# Patient Record
Sex: FEMALE | Race: BLACK OR AFRICAN AMERICAN | Employment: FULL TIME | URBAN - METROPOLITAN AREA
[De-identification: names, ages, dates, MRNs, and addresses within clinical notes are randomized per-mention and may not be internally consistent; named-entity substitution may affect disease eponyms.]

---

## 2017-01-04 ENCOUNTER — TELEPHONE (OUTPATIENT)
Dept: BARIATRICS/WEIGHT MGMT | Age: 57
End: 2017-01-04

## 2017-02-20 ENCOUNTER — OFFICE VISIT (OUTPATIENT)
Dept: INTERNAL MEDICINE CLINIC | Age: 57
End: 2017-02-20

## 2017-02-20 VITALS
SYSTOLIC BLOOD PRESSURE: 136 MMHG | TEMPERATURE: 97.6 F | WEIGHT: 209 LBS | DIASTOLIC BLOOD PRESSURE: 76 MMHG | RESPIRATION RATE: 12 BRPM | HEART RATE: 73 BPM | HEIGHT: 61 IN | BODY MASS INDEX: 39.46 KG/M2 | OXYGEN SATURATION: 98 %

## 2017-02-20 DIAGNOSIS — K64.9 HEMORRHOIDS, UNSPECIFIED HEMORRHOID TYPE: ICD-10-CM

## 2017-02-20 DIAGNOSIS — Z01.818 PREOPERATIVE EXAMINATION: Primary | ICD-10-CM

## 2017-02-20 DIAGNOSIS — G47.33 OBSTRUCTIVE SLEEP APNEA: ICD-10-CM

## 2017-02-20 DIAGNOSIS — J30.9 ALLERGIC RHINITIS, UNSPECIFIED ALLERGIC RHINITIS TRIGGER, UNSPECIFIED RHINITIS SEASONALITY: ICD-10-CM

## 2017-02-20 DIAGNOSIS — E78.5 HYPERLIPIDEMIA, UNSPECIFIED HYPERLIPIDEMIA TYPE: ICD-10-CM

## 2017-02-20 DIAGNOSIS — E55.9 VITAMIN D DEFICIENCY: ICD-10-CM

## 2017-02-20 PROCEDURE — 99243 OFF/OP CNSLTJ NEW/EST LOW 30: CPT | Performed by: INTERNAL MEDICINE

## 2017-02-20 ASSESSMENT — ENCOUNTER SYMPTOMS
DIARRHEA: 0
CHEST TIGHTNESS: 0
COUGH: 0
RHINORRHEA: 0
WHEEZING: 0
RECTAL PAIN: 1
TROUBLE SWALLOWING: 0
VOMITING: 0
EYE ITCHING: 1
SORE THROAT: 0
NAUSEA: 0
SINUS PRESSURE: 0
ANAL BLEEDING: 1
SHORTNESS OF BREATH: 0
ABDOMINAL PAIN: 0

## 2017-02-21 ENCOUNTER — TELEPHONE (OUTPATIENT)
Dept: INTERNAL MEDICINE CLINIC | Age: 57
End: 2017-02-21

## 2017-03-02 ENCOUNTER — HOSPITAL ENCOUNTER (OUTPATIENT)
Dept: OTHER | Age: 57
Discharge: HOME | End: 2017-03-02
Attending: INTERNAL MEDICINE | Admitting: INTERNAL MEDICINE

## 2017-03-02 DIAGNOSIS — E55.9 VITAMIN D DEFICIENCY: ICD-10-CM

## 2017-03-02 DIAGNOSIS — E78.5 HYPERLIPIDEMIA, UNSPECIFIED HYPERLIPIDEMIA TYPE: ICD-10-CM

## 2017-03-02 LAB
ALBUMIN SERPL-MCNC: 4 G/DL (ref 3.4–5)
ALP BLD-CCNC: 94 U/L (ref 40–129)
ALT SERPL-CCNC: 20 U/L (ref 10–40)
AST SERPL-CCNC: 18 U/L (ref 15–37)
BILIRUB SERPL-MCNC: <0.2 MG/DL (ref 0–1)
BILIRUBIN DIRECT: <0.2 MG/DL (ref 0–0.3)
BILIRUBIN, INDIRECT: NORMAL MG/DL (ref 0–1)
CHOLESTEROL, TOTAL: 187 MG/DL (ref 0–199)
HDLC SERPL-MCNC: 63 MG/DL (ref 40–60)
LDL CHOLESTEROL CALCULATED: 112 MG/DL
TOTAL PROTEIN: 7.1 G/DL (ref 6.4–8.2)
TRIGL SERPL-MCNC: 62 MG/DL (ref 0–150)
VITAMIN D 25-HYDROXY: 15.9 NG/ML
VLDLC SERPL CALC-MCNC: 12 MG/DL

## 2017-04-04 DIAGNOSIS — E55.9 VITAMIN D DEFICIENCY: Primary | ICD-10-CM

## 2017-04-04 DIAGNOSIS — E78.5 HYPERLIPIDEMIA, UNSPECIFIED HYPERLIPIDEMIA TYPE: ICD-10-CM

## 2017-04-04 RX ORDER — CHOLECALCIFEROL (VITAMIN D3) 50 MCG
2000 TABLET ORAL DAILY
Qty: 30 TABLET | Refills: 5 | Status: SHIPPED | OUTPATIENT
Start: 2017-04-04 | End: 2019-01-06 | Stop reason: DRUGHIGH

## 2017-04-04 RX ORDER — SIMVASTATIN 40 MG
40 TABLET ORAL NIGHTLY
Qty: 90 TABLET | Refills: 2 | Status: SHIPPED | OUTPATIENT
Start: 2017-04-04 | End: 2019-01-02 | Stop reason: SDUPTHER

## 2017-07-05 ENCOUNTER — TELEPHONE (OUTPATIENT)
Dept: INTERNAL MEDICINE CLINIC | Age: 57
End: 2017-07-05

## 2017-07-05 DIAGNOSIS — M79.673 PAIN OF FOOT, UNSPECIFIED LATERALITY: Primary | ICD-10-CM

## 2017-07-20 ENCOUNTER — OFFICE VISIT (OUTPATIENT)
Dept: ORTHOPEDIC SURGERY | Age: 57
End: 2017-07-20

## 2017-07-20 VITALS
SYSTOLIC BLOOD PRESSURE: 134 MMHG | BODY MASS INDEX: 39.46 KG/M2 | DIASTOLIC BLOOD PRESSURE: 88 MMHG | WEIGHT: 209 LBS | HEART RATE: 77 BPM | HEIGHT: 61 IN

## 2017-07-20 DIAGNOSIS — M79.671 PAIN OF RIGHT HEEL: Primary | ICD-10-CM

## 2017-07-20 DIAGNOSIS — M77.51 RETROCALCANEAL BURSITIS (BACK OF HEEL), RIGHT: ICD-10-CM

## 2017-07-20 DIAGNOSIS — M76.61 RIGHT ACHILLES TENDINITIS: ICD-10-CM

## 2017-07-20 PROBLEM — M77.50 RETROCALCANEAL BURSITIS (BACK OF HEEL): Status: ACTIVE | Noted: 2017-07-20

## 2017-07-20 PROCEDURE — L4361 PNEUMA/VAC WALK BOOT PRE OTS: HCPCS | Performed by: PODIATRIST

## 2017-07-20 PROCEDURE — 99203 OFFICE O/P NEW LOW 30 MIN: CPT | Performed by: PODIATRIST

## 2017-07-20 PROCEDURE — 73650 X-RAY EXAM OF HEEL: CPT | Performed by: PODIATRIST

## 2017-07-20 RX ORDER — SIMVASTATIN 40 MG
40 TABLET ORAL
COMMUNITY
End: 2017-10-23 | Stop reason: SDUPTHER

## 2017-07-20 RX ORDER — PREDNISONE 10 MG/1
TABLET ORAL
Qty: 26 TABLET | Refills: 0 | Status: SHIPPED | OUTPATIENT
Start: 2017-07-20 | End: 2017-11-28 | Stop reason: ALTCHOICE

## 2017-08-17 ENCOUNTER — TELEPHONE (OUTPATIENT)
Dept: ORTHOPEDIC SURGERY | Age: 57
End: 2017-08-17

## 2017-08-17 ENCOUNTER — OFFICE VISIT (OUTPATIENT)
Dept: ORTHOPEDIC SURGERY | Age: 57
End: 2017-08-17

## 2017-08-17 VITALS
HEIGHT: 61 IN | HEART RATE: 74 BPM | BODY MASS INDEX: 39.46 KG/M2 | SYSTOLIC BLOOD PRESSURE: 122 MMHG | WEIGHT: 209 LBS | DIASTOLIC BLOOD PRESSURE: 74 MMHG

## 2017-08-17 DIAGNOSIS — M76.61 ACHILLES TENDINITIS, RIGHT LEG: ICD-10-CM

## 2017-08-17 DIAGNOSIS — M77.51 RETROCALCANEAL BURSITIS (BACK OF HEEL), RIGHT: Primary | ICD-10-CM

## 2017-08-17 PROCEDURE — 99213 OFFICE O/P EST LOW 20 MIN: CPT | Performed by: PODIATRIST

## 2017-09-07 ENCOUNTER — OFFICE VISIT (OUTPATIENT)
Dept: ORTHOPEDIC SURGERY | Age: 57
End: 2017-09-07

## 2017-09-07 VITALS
SYSTOLIC BLOOD PRESSURE: 117 MMHG | BODY MASS INDEX: 39.46 KG/M2 | DIASTOLIC BLOOD PRESSURE: 72 MMHG | HEIGHT: 61 IN | WEIGHT: 209 LBS | HEART RATE: 64 BPM

## 2017-09-07 DIAGNOSIS — M76.61 ACHILLES TENDINITIS, RIGHT LEG: Primary | ICD-10-CM

## 2017-09-07 PROCEDURE — 99213 OFFICE O/P EST LOW 20 MIN: CPT | Performed by: PODIATRIST

## 2017-09-07 RX ORDER — DEXAMETHASONE SODIUM PHOSPHATE 4 MG/ML
INJECTION, SOLUTION INTRA-ARTICULAR; INTRALESIONAL; INTRAMUSCULAR; INTRAVENOUS; SOFT TISSUE
Qty: 30 ML | Refills: 0 | Status: SHIPPED | OUTPATIENT
Start: 2017-09-07 | End: 2017-11-28 | Stop reason: ALTCHOICE

## 2017-10-04 ENCOUNTER — HOSPITAL ENCOUNTER (OUTPATIENT)
Dept: PHYSICAL THERAPY | Age: 57
Discharge: HOME | End: 2017-10-31
Attending: PODIATRIST | Admitting: PODIATRIST

## 2017-10-04 DIAGNOSIS — E78.5 OTHER AND UNSPECIFIED HYPERLIPIDEMIA: ICD-10-CM

## 2017-10-04 ASSESSMENT — PAIN DESCRIPTION - ORIENTATION: ORIENTATION: RIGHT

## 2017-10-04 ASSESSMENT — PAIN DESCRIPTION - PROGRESSION: CLINICAL_PROGRESSION: GRADUALLY WORSENING

## 2017-10-04 ASSESSMENT — PAIN SCALES - GENERAL: PAINLEVEL_OUTOF10: 9

## 2017-10-04 ASSESSMENT — PAIN DESCRIPTION - FREQUENCY: FREQUENCY: CONTINUOUS

## 2017-10-04 ASSESSMENT — PAIN DESCRIPTION - ONSET: ONSET: GRADUAL

## 2017-10-04 NOTE — PLAN OF CARE
Outpatient Physical Therapy     Phone: [phone] Fax: [phone]     To: Referring Practitioner: Dr. Gayle Delcid       Patient: Samara Briggs   : 1960   MRN: [id number]  Evaluation Date: 10/4/2017      Diagnosis Information:  · Diagnosis: Achilles tendonitis M76.61    · Treatment Diagnosis: Decreased R ankle ROM, decreased gastroc/soleus flexibility, impaired balance,impaired gait, myofascial restrictions gastroc/soleus complexity      Physical Therapy Certification/Re-Certification Form  Dear Dr. Sherley Meléndez   The following patient has been evaluated for physical therapy services. Please review the attached evaluation and/or summary of the patient's plan of care, and verify that you agree therapy should continue by signing the attached document and sending it back to our office. Plan of Care/Treatment to date:  [x] Therapeutic Exercise      [x] Modalities:  [x] Therapeutic Activity        [] Ultrasound    [x] Gait Training        [] Cervical Traction   [x] Neuromuscular Re-education      [] Cold/hotpack    [x] Instruction in HEP        [] Lumbar Traction  [x] Manual Therapy        [] Electrical Stimulation            [x] Aquatic Therapy        [] Iontophoresis        ? [] Lymphedema management  [] Women's Health     Other:  [] Vestibular Rehab        []    []  Needed     Frequency/Duration:  # Days per week: [x] 1 day # Weeks: [] 1 week [] 5 weeks     [] 2 days? [] 2 weeks [] 6 weeks     [] 3 days   [] 3 weeks [] 7 weeks     [] 4 days   [x] 4 weeks [] 8 weeks    Rehab Potential: [] Excellent [x] Good [] Fair  [] Poor     Electronically signed by:  Sandi Grigsby, PT,DPT     If you have any questions or concerns, please don't hesitate to call.   Thank you for your referral.      Physician Signature:________________________________Date:__________________  By signing above, therapists plan is approved by physician

## 2017-10-04 NOTE — FLOWSHEET NOTE
Physical Therapy Daily Treatment Note  Date:  10/4/2017    Patient Name:  Duarte Mc    :  1960  MRN: [id number]  Restrictions/Precautions:    Medical/Treatment Diagnosis Information:   · Diagnosis: Achilles tendonitis M76.61   · Treatment Diagnosis: Decreased R ankle ROM, decreased gastroc/soleus flexibility, impaired balance,impaired gait, myofascial restrictions gastroc/soleus complexity     Tracking Information:  Physician Information Referring Practitioner: Dr. Tang Espinosa of Care Sent Date: 10/4 Signed Received:    Visit Count / Total Visits      Insurance Approved Visits    Approved Dates:     Insurance Information PT Insurance Information: [employer] ($30 copay) (50 visits per year PT/OT, ST combined, 0 used so far)     Progress Note/G-codes   [x]  Yes  []  No Next Due:      Pain level: 9/10     Subjective:  See eval     Objective:   Observation: see eval   Test measurements:      Exercises:  Exercise/Equipment Resistance/Repetitions Other comments   Bike  IB/HR      Stretching and STM/DTM gastroc/soleus     Balance as tolerated      Intrinsic foot strengthening      Ankle strengthening      Mat table  Gastroc/soleus stretch 2X30\" R  Self MFR R gastroc/soleus complex X 2'  Self MFR plantarfascia with tennis ball  Towel scrunches X 10  Great toe stretch 2X30\" R    HEP                                               Other Therapeutic Activities:  10/4/17 Pt was educated on PT POC, Diagnosis, Prognosis, pathomechanics as well as frequency and duration of scheduling future physical therapy appointments. Time was also taken on this day to answer all patient questions and participation in PT. Home Exercise Program:  10/4/17 Patient was educated on home exercise program including distrubution of handout describing exercises, sets, repetitions, frequency and intensity. Exercises/activities include: as listed above.        Manual Treatments: 10/4: none, consider hawk  gastroc/soleus complex and plantarfascia     Modalities:  10/4: CP R ankle X 10' pt in long sitting; consider US       Timed Code Treatment Minutes: 40     Total Treatment Minutes:  65    Treatment/Activity Tolerance:  [x] Patient tolerated treatment well [] Patient limited by fatigue  [] Patient limited by pain  [] Patient limited by other medical complications  [] Other:     Prognosis: [x] Good [] Fair  [] Poor    Patient Requires Follow-up: [x] Yes  [] No    Plan:   [] Continue per plan of care [] Alter current plan (see comments)  [x] Plan of care initiated [] Hold pending MD visit [] Discharge  Plan for Next Session: see above      Electronically signed by:  Ayaka Malin, PT,DPT

## 2017-10-04 NOTE — PROGRESS NOTES
Currently in Pain: Yes  Pain Assessment  Pain Assessment: 0-10  Pain Level: 9  Pain Location:  (R heel)  Pain Orientation: Right  Pain Descriptors: Burning; Callie Harris; Shooting;Pins and needles; Constant  Pain Frequency: Continuous  Pain Onset: Gradual  Clinical Progression: Gradually worsening  Effect of Pain on Daily Activities: ind with all ADLs, just has pain when standing and walking   Patient's Stated Pain Goal: No pain  Pain Intervention(s): Rest (alleve)  Vital Signs  Patient Currently in Pain: Yes    Vision/Hearing  Vision  Vision: Within Functional Limits  Hearing  Hearing: Within functional limits      Social/Functional History  Social/Functional History  Lives With: Spouse  Type of Home: House  Home Layout: Two level; Laundry in basement (1 HR on R)  Home Access: Stairs to enter without rails  Entrance Stairs - Number of Steps: 2 ARCHANA  Bathroom Shower/Tub: Tub/Shower unit  Bathroom Toilet: Standard  Home Equipment:  (knee scooter)  Receives Help From: Family  ADL Assistance: Independent  Homemaking Assistance: Independent  Homemaking Responsibilities: Yes  Ambulation Assistance: Independent  Transfer Assistance: Independent  Active : Yes  Mode of Transportation: Car  Occupation: Full time employment  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   Leisure & Hobbies: line dancing, exercise   Objective     Observation/Palpation  Posture: Fair  Observation: flat feet, pronation with gait (R>L), increased toe out on L     AROM RLE (degrees)  R Ankle Dorsiflexion 0-20: lacking 10 deg, lacking 5 with OP   R Ankle Plantar Flexion 0-45: WNL some pain   R Ankle Forefoot Inversion 0-40: 50 deg painful  R Ankle Forefoot Eversion 0-20: WNL no pain   AROM LLE (degrees)  L Ankle Dorsiflexion 0-20: lacking 10 deg, lacking 5 with OP   L Ankle Plantar Flexion 0-45: WNL  L Ankle Forefoot Inversion 0-40: 30 deg  L Ankle Forefoot Eversion 0-20: 15 deg    Strength RLE  Comment: great toe flexion 4 - 4+/5, great toe gastroc/soleus flexibility, impaired gait, impaired balance, and decreased LE strength resulting in difficulty with ambulation, difficulty amb up/down stairs, and difficulty sleeping. pt is in generally good health and is of low complexity. pt would benfit from skilled PT services to return to PLOF. Treatment Diagnosis: Decreased R ankle ROM, decreased gastroc/soleus flexibility, impaired balance,impaired gait, myofascial restrictions gastroc/soleus complexity   Prognosis: Good  Decision Making: Low Complexity  Patient Education: pt educated on PT diagnosis, prognosis, and POC. Barriers to Learning: none   REQUIRES PT FOLLOW UP: Yes  Activity Tolerance  Activity Tolerance: Patient Tolerated treatment well         Plan   Plan  Times per week: 1  Plan weeks: 4  Current Treatment Recommendations: Strengthening, ROM, Balance Training, Gait Training, Stair training, Neuromuscular Re-education, Manual Therapy - Soft Tissue Mobilization, Manual Therapy - Joint Manipulation, Pain Management, Home Exercise Program, Positioning, Modalities    G-Code  PT G-Codes  Functional Assessment Tool Used: LEFS   Score: 28.75 impaired   Functional Limitation: Mobility: Walking and moving around  Mobility: Walking and Moving Around Current Status (): At least 20 percent but less than 40 percent impaired, limited or restricted  Mobility: Walking and Moving Around Goal Status (): 0 percent impaired, limited or restricted    OutComes Score     LEFS Score: 71.25                  Goals  Long term goals  Time Frame for Long term goals : 4 weeks   Long term goal 1: pt will demonstrate improved R ankle DF AROM to at least 5 deg to demonstrate improved gastroc/soleus flexibility and decrease stress on Jevon's tendon. Long term goal 2: pt will report decreased pain from 0-2/10 on average to return to PLOF.    Long term goal 3: pt will be able to perform single leg stance for at least 10 seconds B to demonstrate improved balance and

## 2017-10-21 DIAGNOSIS — E55.9 VITAMIN D DEFICIENCY: ICD-10-CM

## 2017-10-23 RX ORDER — ERGOCALCIFEROL 1.25 MG/1
CAPSULE ORAL
Qty: 12 CAPSULE | Refills: 0 | Status: SHIPPED | OUTPATIENT
Start: 2017-10-23 | End: 2018-12-18 | Stop reason: SDDI

## 2017-11-01 ENCOUNTER — HOSPITAL ENCOUNTER (OUTPATIENT)
Dept: OTHER | Age: 57
Discharge: HOME | End: 2017-11-30
Attending: PODIATRIST | Admitting: PODIATRIST

## 2017-11-28 ENCOUNTER — OFFICE VISIT (OUTPATIENT)
Dept: INTERNAL MEDICINE CLINIC | Age: 57
End: 2017-11-28

## 2017-11-28 ENCOUNTER — HOSPITAL ENCOUNTER (OUTPATIENT)
Dept: MAMMOGRAPHY | Age: 57
Discharge: HOME | End: 2017-11-28
Attending: INTERNAL MEDICINE | Admitting: INTERNAL MEDICINE

## 2017-11-28 VITALS
HEART RATE: 79 BPM | DIASTOLIC BLOOD PRESSURE: 78 MMHG | WEIGHT: 205.8 LBS | OXYGEN SATURATION: 98 % | SYSTOLIC BLOOD PRESSURE: 124 MMHG | HEIGHT: 61 IN | TEMPERATURE: 97.7 F | RESPIRATION RATE: 12 BRPM | BODY MASS INDEX: 38.86 KG/M2

## 2017-11-28 DIAGNOSIS — Z00.00 ANNUAL PHYSICAL EXAM: ICD-10-CM

## 2017-11-28 DIAGNOSIS — E55.9 VITAMIN D DEFICIENCY: ICD-10-CM

## 2017-11-28 DIAGNOSIS — J40 BRONCHITIS: ICD-10-CM

## 2017-11-28 DIAGNOSIS — Z12.39 BREAST CANCER SCREENING: ICD-10-CM

## 2017-11-28 DIAGNOSIS — Z00.00 ANNUAL PHYSICAL EXAM: Primary | ICD-10-CM

## 2017-11-28 DIAGNOSIS — E78.2 MIXED HYPERLIPIDEMIA: ICD-10-CM

## 2017-11-28 DIAGNOSIS — Z13.6 SCREENING FOR ISCHEMIC HEART DISEASE: ICD-10-CM

## 2017-11-28 DIAGNOSIS — N95.9 MENOPAUSAL DISORDER: ICD-10-CM

## 2017-11-28 DIAGNOSIS — M25.512 LEFT SHOULDER PAIN, UNSPECIFIED CHRONICITY: ICD-10-CM

## 2017-11-28 DIAGNOSIS — J06.9 UPPER RESPIRATORY TRACT INFECTION, UNSPECIFIED TYPE: ICD-10-CM

## 2017-11-28 LAB
A/G RATIO: 1.5 (ref 1.1–2.2)
ALBUMIN SERPL-MCNC: 4.2 G/DL (ref 3.4–5)
ALP BLD-CCNC: 100 U/L (ref 40–129)
ALT SERPL-CCNC: 20 U/L (ref 10–40)
ANION GAP SERPL CALCULATED.3IONS-SCNC: 12 MMOL/L (ref 3–16)
AST SERPL-CCNC: 22 U/L (ref 15–37)
BASOPHILS ABSOLUTE: 0 K/UL (ref 0–0.2)
BASOPHILS RELATIVE PERCENT: 0.6 %
BILIRUB SERPL-MCNC: 0.3 MG/DL (ref 0–1)
BILIRUBIN, POC: NORMAL
BLOOD URINE, POC: NORMAL
BUN BLDV-MCNC: 15 MG/DL (ref 7–20)
CALCIUM SERPL-MCNC: 9.5 MG/DL (ref 8.3–10.6)
CHLORIDE BLD-SCNC: 102 MMOL/L (ref 99–110)
CHOLESTEROL, TOTAL: 219 MG/DL (ref 0–199)
CLARITY, POC: NORMAL
CO2: 27 MMOL/L (ref 21–32)
COLOR, POC: NORMAL
CREAT SERPL-MCNC: 0.7 MG/DL (ref 0.6–1.1)
EOSINOPHILS ABSOLUTE: 0.1 K/UL (ref 0–0.6)
EOSINOPHILS RELATIVE PERCENT: 2.7 %
GFR AFRICAN AMERICAN: >60
GFR NON-AFRICAN AMERICAN: >60
GLOBULIN: 2.8 G/DL
GLUCOSE BLD-MCNC: 72 MG/DL (ref 70–99)
GLUCOSE URINE, POC: NORMAL
HCT VFR BLD CALC: 37.6 % (ref 36–48)
HDLC SERPL-MCNC: 81 MG/DL (ref 40–60)
HEMOGLOBIN: 12 G/DL (ref 12–16)
KETONES, POC: NORMAL
LDL CHOLESTEROL CALCULATED: 125 MG/DL
LEUKOCYTE EST, POC: NORMAL
LYMPHOCYTES ABSOLUTE: 1.9 K/UL (ref 1–5.1)
LYMPHOCYTES RELATIVE PERCENT: 36.4 %
MCH RBC QN AUTO: 26.4 PG (ref 26–34)
MCHC RBC AUTO-ENTMCNC: 32 G/DL (ref 31–36)
MCV RBC AUTO: 82.6 FL (ref 80–100)
MONOCYTES ABSOLUTE: 0.6 K/UL (ref 0–1.3)
MONOCYTES RELATIVE PERCENT: 11.1 %
NEUTROPHILS ABSOLUTE: 2.5 K/UL (ref 1.7–7.7)
NEUTROPHILS RELATIVE PERCENT: 49.2 %
NITRITE, POC: NORMAL
PDW BLD-RTO: 14.3 % (ref 12.4–15.4)
PH, POC: 6
PLATELET # BLD: 335 K/UL (ref 135–450)
PMV BLD AUTO: 8.3 FL (ref 5–10.5)
POTASSIUM SERPL-SCNC: 4 MMOL/L (ref 3.5–5.1)
PROTEIN, POC: NORMAL
RBC # BLD: 4.55 M/UL (ref 4–5.2)
SODIUM BLD-SCNC: 141 MMOL/L (ref 136–145)
SPECIFIC GRAVITY, POC: >=1.03
TOTAL PROTEIN: 7 G/DL (ref 6.4–8.2)
TRIGL SERPL-MCNC: 64 MG/DL (ref 0–150)
TSH SERPL DL<=0.05 MIU/L-ACNC: 0.86 UIU/ML (ref 0.27–4.2)
UROBILINOGEN, POC: 0.2
VITAMIN D 25-HYDROXY: 25.1 NG/ML
VLDLC SERPL CALC-MCNC: 13 MG/DL
WBC # BLD: 5.1 K/UL (ref 4–11)

## 2017-11-28 PROCEDURE — 93000 ELECTROCARDIOGRAM COMPLETE: CPT | Performed by: INTERNAL MEDICINE

## 2017-11-28 PROCEDURE — 81002 URINALYSIS NONAUTO W/O SCOPE: CPT | Performed by: INTERNAL MEDICINE

## 2017-11-28 PROCEDURE — 99396 PREV VISIT EST AGE 40-64: CPT | Performed by: INTERNAL MEDICINE

## 2017-11-28 RX ORDER — AZITHROMYCIN 250 MG/1
TABLET, FILM COATED ORAL
Qty: 1 PACKET | Refills: 0 | Status: SHIPPED | OUTPATIENT
Start: 2017-11-28 | End: 2017-12-08

## 2017-11-28 RX ORDER — ALBUTEROL SULFATE 90 UG/1
2 AEROSOL, METERED RESPIRATORY (INHALATION) EVERY 6 HOURS PRN
Qty: 1 INHALER | Refills: 0 | Status: SHIPPED | OUTPATIENT
Start: 2017-11-28 | End: 2021-07-01

## 2017-11-28 RX ORDER — BROMPHENIRAMINE MALEATE, PSEUDOEPHEDRINE HYDROCHLORIDE, AND DEXTROMETHORPHAN HYDROBROMIDE 2; 30; 10 MG/5ML; MG/5ML; MG/5ML
5 SYRUP ORAL 4 TIMES DAILY PRN
Qty: 120 ML | Refills: 0 | Status: SHIPPED | OUTPATIENT
Start: 2017-11-28 | End: 2018-12-18

## 2017-11-28 ASSESSMENT — ENCOUNTER SYMPTOMS
CONSTIPATION: 0
VOMITING: 0
APNEA: 1
EYE ITCHING: 0
SINUS PAIN: 0
PHOTOPHOBIA: 0
ANAL BLEEDING: 0
RHINORRHEA: 0
ABDOMINAL PAIN: 0
FACIAL SWELLING: 0
DIARRHEA: 0
CHOKING: 0
SINUS PRESSURE: 0
ABDOMINAL DISTENTION: 0
RECTAL PAIN: 0
EYE REDNESS: 0
COUGH: 1
TROUBLE SWALLOWING: 0
BACK PAIN: 1
EYE PAIN: 0
EYE DISCHARGE: 0
BLOOD IN STOOL: 0
WHEEZING: 1
NAUSEA: 0
SORE THROAT: 0
VOICE CHANGE: 1
CHEST TIGHTNESS: 0
SHORTNESS OF BREATH: 0

## 2017-11-28 ASSESSMENT — PATIENT HEALTH QUESTIONNAIRE - PHQ9
1. LITTLE INTEREST OR PLEASURE IN DOING THINGS: 1
SUM OF ALL RESPONSES TO PHQ9 QUESTIONS 1 & 2: 2
SUM OF ALL RESPONSES TO PHQ QUESTIONS 1-9: 2
2. FEELING DOWN, DEPRESSED OR HOPELESS: 1

## 2017-11-28 NOTE — PROGRESS NOTES
Santhosh Lucero   YOB: 1960    Date of Visit:  2017    Chief Complaint   Patient presents with    Annual Exam     fasting        HPI  Pt presents for annual physical exam.    Pt has hyperlipidemia. Pt tolerates Simvastatin . Pt hasn't been dec fat and exercising. Pt is not exercising due to achilles tendonitis. Pt has vitamin D deficiency. Pt is taking vitamin D 50k out of daily for 30 days    Pt is menopausal. Pt takes Premarin 0.3mg daily. Pt has taken hormones since . Pt c/o 1 month h/o cough with mucus. Yellow 2 weeks ago and now moving to chest. Pt c/o wheezing and tickle in her throat. Pt staes her inhaler  in 2017. Pt is taking Robitussin. Mammogram done today  Colonoscopy done 17  Left shoulder pain for a long time at joint. Pt states if she raises her arm at least 5 times in a row it aggravates it. Pt states it hurts if is it going to rain. Pt states it is a constant ache. Review of Systems   Constitutional: Positive for unexpected weight change (weight is up and down). Negative for activity change, appetite change, chills, diaphoresis, fatigue and fever. HENT: Positive for congestion, postnasal drip and voice change (last week). Negative for ear discharge, ear pain, facial swelling, hearing loss, mouth sores, nosebleeds, rhinorrhea, sinus pain, sinus pressure, sneezing, sore throat, tinnitus and trouble swallowing. Eyes: Negative for photophobia, pain, discharge, redness, itching and visual disturbance. Respiratory: Positive for apnea (has sleep apnea doesn't use CPAP), cough and wheezing. Negative for choking, chest tightness and shortness of breath. Cardiovascular: Negative for chest pain, palpitations and leg swelling. Gastrointestinal: Negative for abdominal distention, abdominal pain, anal bleeding, blood in stool, constipation, diarrhea, nausea, rectal pain and vomiting.    Endocrine: Negative for cold intolerance and heat 12 capsule 0    Cholecalciferol (VITAMIN D) 2000 UNITS TABS tablet Take 1 tablet by mouth daily 30 tablet 5    simvastatin (ZOCOR) 40 MG tablet Take 1 tablet by mouth nightly 90 tablet 2    estrogens, conjugated, (PREMARIN) 0.3 MG tablet TAKE 1 TABLET BY MOUTH DAILY 30 tablet 5    loratadine (CLARITIN) 10 MG tablet Take 1 tablet by mouth daily. 30 tablet 3         Allergies   Allergen Reactions    Bee Venom Swelling    Fish-Derived Products Swelling    Other Swelling     TREE NUTS    Shellfish Allergy Swelling    Tylenol [Acetaminophen]      Tylenol 3 makes pt itch       Social History   Substance Use Topics    Smoking status: Never Smoker    Smokeless tobacco: Never Used    Alcohol use No       Family History   Problem Relation Age of Onset    Cancer Mother 64      lung cancer    Asthma Grandchild     Diabetes Neg Hx     Heart Disease Neg Hx     Hypertension Neg Hx        Immunization History   Administered Date(s) Administered    Tdap (Boostrix, Adacel) 2016       Vitals:    17 1107   BP: 124/78   Site: Left Arm   Position: Sitting   Cuff Size: Large Adult   Pulse: 79   Resp: 12   Temp: 97.7 °F (36.5 °C)   TempSrc: Oral   SpO2: 98%   Weight: 205 lb 12.8 oz (93.4 kg)   Height: 5' 1\" (1.549 m)     Body mass index is 38.89 kg/m². Physical Exam   Constitutional: She is oriented to person, place, and time. She appears well-developed and well-nourished. No distress. HENT:   Head: Normocephalic and atraumatic. Right Ear: Hearing, tympanic membrane and ear canal normal.   Left Ear: Hearing, tympanic membrane and ear canal normal.   Nose: Nose normal.   Mouth/Throat: Uvula is midline, oropharynx is clear and moist and mucous membranes are normal.   Eyes: Conjunctivae, EOM and lids are normal. Pupils are equal, round, and reactive to light. Neck: Neck supple. Carotid bruit is not present. No thyromegaly present.    Cardiovascular: Normal rate, regular rhythm, S1 normal, S2 normal, normal heart sounds and intact distal pulses. Exam reveals no gallop and no friction rub. No murmur heard. Pulmonary/Chest: Effort normal and breath sounds normal. No respiratory distress. She has no wheezes. She has no rhonchi. She has no rales. Right breast exhibits no inverted nipple, no mass, no nipple discharge, no skin change and no tenderness. Left breast exhibits no inverted nipple, no mass, no nipple discharge, no skin change and no tenderness. Breasts are symmetrical.   Abdominal: Soft. Bowel sounds are normal. She exhibits no distension and no mass. There is no hepatosplenomegaly. There is no tenderness. There is no rebound. Musculoskeletal: Normal range of motion. She exhibits no edema. Right shoulder: She exhibits normal range of motion and no tenderness. Left shoulder: She exhibits tenderness. She exhibits normal range of motion. Right elbow: She exhibits no swelling. No tenderness found. Left elbow: She exhibits no swelling. No tenderness found. Right wrist: She exhibits no tenderness and no swelling. Left wrist: She exhibits no tenderness and no swelling. Right hip: She exhibits no tenderness. Left hip: She exhibits no tenderness. Right knee: She exhibits no swelling. No tenderness found. Left knee: She exhibits no swelling. No tenderness found. Right ankle: She exhibits no swelling. No tenderness. Left ankle: She exhibits no swelling. No tenderness. Cervical back: She exhibits normal range of motion, no tenderness and no spasm. Thoracic back: She exhibits no tenderness and no spasm. Lumbar back: She exhibits normal range of motion, no tenderness and no spasm. Right upper arm: She exhibits no tenderness. Left upper arm: She exhibits no tenderness. Right hand: She exhibits no tenderness and no swelling. Left hand: She exhibits no tenderness and no swelling. Right upper leg: She exhibits no tenderness. Left upper leg: She exhibits no tenderness. Right lower leg: She exhibits no tenderness. Left lower leg: She exhibits no tenderness. Right foot: There is no tenderness and no swelling. Left foot: There is no tenderness and no swelling. Lymphadenopathy:        Head (right side): No submandibular adenopathy present. Head (left side): No submandibular adenopathy present. She has no cervical adenopathy. She has no axillary adenopathy. Neurological: She is alert and oriented to person, place, and time. She has normal strength and normal reflexes. No cranial nerve deficit. Gait normal.   Skin: Skin is warm, dry and intact. No bruising, no lesion and no rash noted. No erythema. Psychiatric: She has a normal mood and affect. Her speech is normal.           Results for POC orders placed in visit on 11/28/17   POCT Urinalysis no Micro   Result Value Ref Range    Color, UA neg     Clarity, UA neg     Glucose, UA POC neg     Bilirubin, UA neg     Ketones, UA neg     Spec Grav, UA >=1.030     Blood, UA POC neg     pH, UA 6.0     Protein, UA POC neg     Urobilinogen, UA 0.2     Leukocytes, UA neg     Nitrite, UA neg        Assessment/Plan     1. Annual physical exam    - POCT Urinalysis no Micro  - Comprehensive Metabolic Panel; Future  - CBC Auto Differential; Future  - Lipid Panel; Future  - TSH without Reflex; Future    2. Mixed hyperlipidemia    - Lipid Panel; Future    3. Vitamin D deficiency    - Vitamin D 25 Hydroxy; Future    4. Menopausal disorder      5. Screening for ischemic heart disease  - EKG 12 Lead    6. Upper respiratory tract infection, unspecified type    - azithromycin (ZITHROMAX) 250 MG tablet; Take 2 tabs (500 mg) on Day 1, and take 1 tab (250 mg) on days 2 through 5. Dispense: 1 packet; Refill: 0  - brompheniramine-pseudoephedrine-DM (BROMFED DM) 30-2-10 MG/5ML syrup;  Take 5 mLs by mouth 4 times daily as needed for Congestion or Cough  Dispense: 120 mL; Refill: 0    7. Bronchitis    - azithromycin (ZITHROMAX) 250 MG tablet; Take 2 tabs (500 mg) on Day 1, and take 1 tab (250 mg) on days 2 through 5. Dispense: 1 packet; Refill: 0  - albuterol sulfate HFA (PROAIR HFA) 108 (90 Base) MCG/ACT inhaler; Inhale 2 puffs into the lungs every 6 hours as needed for Wheezing  Dispense: 1 Inhaler; Refill: 0  - brompheniramine-pseudoephedrine-DM (BROMFED DM) 30-2-10 MG/5ML syrup; Take 5 mLs by mouth 4 times daily as needed for Congestion or Cough  Dispense: 120 mL; Refill: 0  - XR CHEST STANDARD (2 VW); Future    8. Left shoulder pain, unspecified chronicity    - Sabas Heath MD (Sports)      Discussed medications with patient, who voiced understanding of their use and indications. All questions answered. Return in about 6 months (around 5/28/2018) for hyperlipidemia and vitamin D deficiency.

## 2017-11-28 NOTE — PATIENT INSTRUCTIONS
alcohol can cause health problems. Follow your doctor's advice about when to have certain tests. These tests can spot problems early. · Cholesterol. Your doctor will tell you how often to have this done based on your age, family history, or other things that can increase your risk for heart attack and stroke. · Blood pressure. Have your blood pressure checked during a routine doctor visit. Your doctor will tell you how often to check your blood pressure based on your age, your blood pressure results, and other factors. · Mammogram. Ask your doctor how often you should have a mammogram, which is an X-ray of your breasts. A mammogram can spot breast cancer before it can be felt and when it is easiest to treat. · Pap test and pelvic exam. Ask your doctor how often you should have a Pap test. You may not need to have a Pap test as often as you used to. · Vision. Have your eyes checked every year or two or as often as your doctor suggests. Some experts recommend that you have yearly exams for glaucoma and other age-related eye problems starting at age 48. · Hearing. Tell your doctor if you notice any change in your hearing. You can have tests to find out how well you hear. · Diabetes. Ask your doctor whether you should have tests for diabetes. · Colon cancer. You should begin tests for colon cancer at age 48. You may have one of several tests. Your doctor will tell you how often to have tests based on your age and risk. Risks include whether you already had a precancerous polyp removed from your colon or whether your parents, sisters and brothers, or children have had colon cancer. · Thyroid disease. Talk to your doctor about whether to have your thyroid checked as part of a regular physical exam. Women have an increased chance of a thyroid problem. · Osteoporosis. You should begin tests for bone density at age 72.  If you are younger than 72, ask your doctor whether you have factors that may increase your risk for this disease. You may want to have this test before age 72. · Heart attack and stroke risk. At least every 4 to 6 years, you should have your risk for heart attack and stroke assessed. Your doctor uses factors such as your age, blood pressure, cholesterol, and whether you smoke or have diabetes to show what your risk for a heart attack or stroke is over the next 10 years. When should you call for help? Watch closely for changes in your health, and be sure to contact your doctor if you have any problems or symptoms that concern you. Where can you learn more? Go to [email or web address] org and sign in to your [employer] account. Enter T335 in the [facility] box to learn more about \"Well Visit, Women 50 to 72: Care Instructions. \"     If you do not have an account, please click on the \"Sign Up Now\" link. Current as of: July 19, 2016  Content Version: 11.3  © 0627-4920 [employer]. Care instructions adapted under license by [facility] ([facility]). If you have questions about a medical condition or this instruction, always ask your healthcare professional. [place] [address] any warranty or liability for your use of this information.

## 2017-12-01 ENCOUNTER — OFFICE VISIT (OUTPATIENT)
Dept: ORTHOPEDIC SURGERY | Age: 57
End: 2017-12-01

## 2017-12-01 VITALS — BODY MASS INDEX: 38.71 KG/M2 | HEIGHT: 61 IN | WEIGHT: 205 LBS

## 2017-12-01 DIAGNOSIS — M19.019 AC JOINT ARTHROPATHY: ICD-10-CM

## 2017-12-01 DIAGNOSIS — M25.512 LEFT SHOULDER PAIN, UNSPECIFIED CHRONICITY: Primary | ICD-10-CM

## 2017-12-01 PROCEDURE — 73030 X-RAY EXAM OF SHOULDER: CPT | Performed by: ORTHOPAEDIC SURGERY

## 2017-12-01 PROCEDURE — 99243 OFF/OP CNSLTJ NEW/EST LOW 30: CPT | Performed by: ORTHOPAEDIC SURGERY

## 2018-09-26 PROBLEM — Z01.818 PREOPERATIVE EXAMINATION: Status: RESOLVED | Noted: 2017-02-20 | Resolved: 2018-09-26

## 2018-12-13 DIAGNOSIS — E78.5 HYPERLIPIDEMIA, UNSPECIFIED HYPERLIPIDEMIA TYPE: ICD-10-CM

## 2018-12-13 RX ORDER — SIMVASTATIN 40 MG
TABLET ORAL
Qty: 90 TABLET | Refills: 0 | OUTPATIENT
Start: 2018-12-13

## 2018-12-18 ENCOUNTER — OFFICE VISIT (OUTPATIENT)
Dept: INTERNAL MEDICINE CLINIC | Age: 58
End: 2018-12-18
Payer: COMMERCIAL

## 2018-12-18 VITALS
DIASTOLIC BLOOD PRESSURE: 80 MMHG | BODY MASS INDEX: 41.88 KG/M2 | HEART RATE: 86 BPM | OXYGEN SATURATION: 96 % | SYSTOLIC BLOOD PRESSURE: 116 MMHG | WEIGHT: 221.8 LBS | HEIGHT: 61 IN

## 2018-12-18 DIAGNOSIS — R63.5 WEIGHT GAIN: ICD-10-CM

## 2018-12-18 DIAGNOSIS — E55.9 VITAMIN D DEFICIENCY: ICD-10-CM

## 2018-12-18 DIAGNOSIS — E78.2 MIXED HYPERLIPIDEMIA: Primary | ICD-10-CM

## 2018-12-18 DIAGNOSIS — E66.01 MORBID OBESITY WITH BODY MASS INDEX (BMI) OF 40.0 TO 44.9 IN ADULT (HCC): ICD-10-CM

## 2018-12-18 PROCEDURE — 99214 OFFICE O/P EST MOD 30 MIN: CPT | Performed by: INTERNAL MEDICINE

## 2018-12-18 ASSESSMENT — PATIENT HEALTH QUESTIONNAIRE - PHQ9
2. FEELING DOWN, DEPRESSED OR HOPELESS: 0
1. LITTLE INTEREST OR PLEASURE IN DOING THINGS: 0
SUM OF ALL RESPONSES TO PHQ QUESTIONS 1-9: 0
SUM OF ALL RESPONSES TO PHQ QUESTIONS 1-9: 0
SUM OF ALL RESPONSES TO PHQ9 QUESTIONS 1 & 2: 0

## 2018-12-18 NOTE — PROGRESS NOTES
Negative for dysphoric mood and sleep disturbance. The patient is not nervous/anxious. Allergies   Allergen Reactions    Bee Venom Swelling    Fish-Derived Products Swelling    Other Swelling     TREE NUTS    Shellfish Allergy Swelling    Tylenol [Acetaminophen]      Tylenol 3 makes pt itch     Outpatient Prescriptions Marked as Taking for the 12/18/18 encounter (Office Visit) with Mario Palafox MD   Medication Sig Dispense Refill    psyllium (METAMUCIL) 0.52 g capsule Take 1.04 g by mouth daily      albuterol sulfate HFA (PROAIR HFA) 108 (90 Base) MCG/ACT inhaler Inhale 2 puffs into the lungs every 6 hours as needed for Wheezing 1 Inhaler 0    Cholecalciferol (VITAMIN D) 2000 UNITS TABS tablet Take 1 tablet by mouth daily 30 tablet 5    simvastatin (ZOCOR) 40 MG tablet Take 1 tablet by mouth nightly 90 tablet 2    loratadine (CLARITIN) 10 MG tablet Take 1 tablet by mouth daily. 30 tablet 3         Vitals:    12/18/18 1355   BP: 116/80   Site: Left Upper Arm   Position: Sitting   Cuff Size: Large Adult   Pulse: 86   SpO2: 96%   Weight: 221 lb 12.8 oz (100.6 kg)   Height: 5' 1\" (1.549 m)     Body mass index is 41.91 kg/m². Physical Exam      No results found for this visit on 12/18/18. Lab Review   No visits with results within 6 Month(s) from this visit.    Latest known visit with results is:   Orders Only on 11/28/2017   Component Date Value    Sodium 11/28/2017 141     Potassium 11/28/2017 4.0     Chloride 11/28/2017 102     CO2 11/28/2017 27     Anion Gap 11/28/2017 12     Glucose 11/28/2017 72     BUN 11/28/2017 15     CREATININE 11/28/2017 0.7     GFR Non- 11/28/2017 >60     GFR  11/28/2017 >60     Calcium 11/28/2017 9.5     Total Protein 11/28/2017 7.0     Alb 11/28/2017 4.2     Albumin/Globulin Ratio 11/28/2017 1.5     Total Bilirubin 11/28/2017 0.3     Alkaline Phosphatase 11/28/2017 100     ALT 11/28/2017 20     AST 11/28/2017 22  Globulin 11/28/2017 2.8     WBC 11/28/2017 5.1     RBC 11/28/2017 4.55     Hemoglobin 11/28/2017 12.0     Hematocrit 11/28/2017 37.6     MCV 11/28/2017 82.6     MCH 11/28/2017 26.4     MCHC 11/28/2017 32.0     RDW 11/28/2017 14.3     Platelets 97/46/1863 335     MPV 11/28/2017 8.3     Neutrophils % 11/28/2017 49.2     Lymphocytes % 11/28/2017 36.4     Monocytes % 11/28/2017 11.1     Eosinophils % 11/28/2017 2.7     Basophils % 11/28/2017 0.6     Neutrophils # 11/28/2017 2.5     Lymphocytes # 11/28/2017 1.9     Monocytes # 11/28/2017 0.6     Eosinophils # 11/28/2017 0.1     Basophils # 11/28/2017 0.0     Cholesterol, Total 11/28/2017 219*    Triglycerides 11/28/2017 64     HDL 11/28/2017 81*    LDL Calculated 11/28/2017 125*    VLDL Cholesterol Calcula* 11/28/2017 13     TSH 11/28/2017 0.86     Vit D, 25-Hydroxy 11/28/2017 25.1*         Assessment/Plan     1. Mixed hyperlipidemia  - patient hasn't taken medication in 1 year   - will make recommendation regarding medication after lab done  - Comprehensive Metabolic Panel; Future  - Lipid Panel; Future    2. Vitamin D deficiency  -continue same dose of vitamin D  --will adjust dose of vitamin D if needed based on lab results  - Vitamin D 25 Hydroxy; Future    3. Weight gain  - Comprehensive Metabolic Panel; Future  - [facility]  - TSH without Reflex; Future  - Hemoglobin A1C; Future    4. Morbid obesity with body mass index (BMI) of 40.0 to 44.9 in adult [facility])  - Comprehensive Metabolic Panel; Future  - [facility]  - TSH without Reflex;  Future  - Hemoglobin A1C; Future  - Referral to Weight Management      Return in about 3 months (around 3/18/2019) for annual physical exam.

## 2018-12-24 ENCOUNTER — HOSPITAL ENCOUNTER (OUTPATIENT)
Age: 58
Discharge: HOME | End: 2018-12-24
Payer: COMMERCIAL

## 2018-12-24 DIAGNOSIS — E55.9 VITAMIN D DEFICIENCY: ICD-10-CM

## 2018-12-24 DIAGNOSIS — E78.2 MIXED HYPERLIPIDEMIA: ICD-10-CM

## 2018-12-24 DIAGNOSIS — R63.5 WEIGHT GAIN: ICD-10-CM

## 2018-12-24 DIAGNOSIS — E66.01 MORBID OBESITY WITH BODY MASS INDEX (BMI) OF 40.0 TO 44.9 IN ADULT (HCC): ICD-10-CM

## 2018-12-24 LAB
A/G RATIO: 1.4 (ref 1.1–2.2)
ALBUMIN SERPL-MCNC: 4.1 G/DL (ref 3.4–5)
ALP BLD-CCNC: 93 U/L (ref 40–129)
ALT SERPL-CCNC: 20 U/L (ref 10–40)
ANION GAP SERPL CALCULATED.3IONS-SCNC: 13 MMOL/L (ref 3–16)
AST SERPL-CCNC: 19 U/L (ref 15–37)
BILIRUB SERPL-MCNC: 0.3 MG/DL (ref 0–1)
BUN BLDV-MCNC: 11 MG/DL (ref 7–20)
CALCIUM SERPL-MCNC: 9.3 MG/DL (ref 8.3–10.6)
CHLORIDE BLD-SCNC: 104 MMOL/L (ref 99–110)
CHOLESTEROL, TOTAL: 242 MG/DL (ref 0–199)
CO2: 26 MMOL/L (ref 21–32)
CREAT SERPL-MCNC: 0.8 MG/DL (ref 0.6–1.1)
ESTIMATED AVERAGE GLUCOSE: 125.5 MG/DL
GFR AFRICAN AMERICAN: >60
GFR NON-AFRICAN AMERICAN: >60
GLOBULIN: 3 G/DL
GLUCOSE BLD-MCNC: 86 MG/DL (ref 70–99)
HBA1C MFR BLD: 6 %
HDLC SERPL-MCNC: 62 MG/DL (ref 40–60)
LDL CHOLESTEROL CALCULATED: 165 MG/DL
POTASSIUM SERPL-SCNC: 3.8 MMOL/L (ref 3.5–5.1)
SODIUM BLD-SCNC: 143 MMOL/L (ref 136–145)
TOTAL PROTEIN: 7.1 G/DL (ref 6.4–8.2)
TRIGL SERPL-MCNC: 75 MG/DL (ref 0–150)
TSH SERPL DL<=0.05 MIU/L-ACNC: 1.54 UIU/ML (ref 0.27–4.2)
VITAMIN D 25-HYDROXY: 13.4 NG/ML
VLDLC SERPL CALC-MCNC: 15 MG/DL

## 2018-12-24 PROCEDURE — 83036 HEMOGLOBIN GLYCOSYLATED A1C: CPT

## 2018-12-24 PROCEDURE — 82306 VITAMIN D 25 HYDROXY: CPT

## 2018-12-24 PROCEDURE — 84443 ASSAY THYROID STIM HORMONE: CPT

## 2018-12-24 PROCEDURE — 36415 COLL VENOUS BLD VENIPUNCTURE: CPT

## 2018-12-24 PROCEDURE — 80053 COMPREHEN METABOLIC PANEL: CPT

## 2018-12-24 PROCEDURE — 80061 LIPID PANEL: CPT

## 2018-12-26 ASSESSMENT — ENCOUNTER SYMPTOMS
COUGH: 0
VOMITING: 0
SORE THROAT: 0
ABDOMINAL PAIN: 0
RHINORRHEA: 0
SINUS PRESSURE: 0
CHEST TIGHTNESS: 0
CONSTIPATION: 0
BLOOD IN STOOL: 0
NAUSEA: 0
SHORTNESS OF BREATH: 0
WHEEZING: 0
DIARRHEA: 0

## 2019-01-02 ENCOUNTER — TELEPHONE (OUTPATIENT)
Dept: INTERNAL MEDICINE CLINIC | Age: 59
End: 2019-01-02

## 2019-01-02 DIAGNOSIS — E78.5 HYPERLIPIDEMIA, UNSPECIFIED HYPERLIPIDEMIA TYPE: ICD-10-CM

## 2019-01-03 ENCOUNTER — HOSPITAL ENCOUNTER (OUTPATIENT)
Dept: WOMENS IMAGING | Age: 59
Discharge: HOME | End: 2019-01-03
Payer: COMMERCIAL

## 2019-01-03 DIAGNOSIS — Z12.31 VISIT FOR SCREENING MAMMOGRAM: ICD-10-CM

## 2019-01-03 PROCEDURE — 77067 SCR MAMMO BI INCL CAD: CPT

## 2019-01-04 ENCOUNTER — TELEPHONE (OUTPATIENT)
Dept: INTERNAL MEDICINE CLINIC | Age: 59
End: 2019-01-04

## 2019-01-06 DIAGNOSIS — E55.9 VITAMIN D DEFICIENCY: Primary | ICD-10-CM

## 2019-01-06 DIAGNOSIS — E78.2 MIXED HYPERLIPIDEMIA: ICD-10-CM

## 2019-01-06 RX ORDER — SIMVASTATIN 40 MG
40 TABLET ORAL NIGHTLY
Qty: 90 TABLET | Refills: 1 | Status: SHIPPED | OUTPATIENT
Start: 2019-01-06 | End: 2021-07-01

## 2019-01-06 RX ORDER — ERGOCALCIFEROL 1.25 MG/1
50000 CAPSULE ORAL WEEKLY
Qty: 4 CAPSULE | Refills: 3 | Status: SHIPPED | OUTPATIENT
Start: 2019-01-06 | End: 2021-07-01

## 2019-06-11 ENCOUNTER — NURSE TRIAGE (OUTPATIENT)
Dept: OTHER | Facility: CLINIC | Age: 59
End: 2019-06-11

## 2019-06-11 ENCOUNTER — OFFICE VISIT (OUTPATIENT)
Dept: INTERNAL MEDICINE CLINIC | Age: 59
End: 2019-06-11
Payer: COMMERCIAL

## 2019-06-11 VITALS
TEMPERATURE: 98.1 F | RESPIRATION RATE: 14 BRPM | OXYGEN SATURATION: 96 % | DIASTOLIC BLOOD PRESSURE: 80 MMHG | HEIGHT: 61 IN | SYSTOLIC BLOOD PRESSURE: 130 MMHG | HEART RATE: 82 BPM | BODY MASS INDEX: 42.67 KG/M2 | WEIGHT: 226 LBS

## 2019-06-11 DIAGNOSIS — E55.9 VITAMIN D DEFICIENCY: ICD-10-CM

## 2019-06-11 DIAGNOSIS — E78.2 MIXED HYPERLIPIDEMIA: ICD-10-CM

## 2019-06-11 DIAGNOSIS — R22.0 LIP SWELLING: ICD-10-CM

## 2019-06-11 DIAGNOSIS — R73.03 PREDIABETES: ICD-10-CM

## 2019-06-11 DIAGNOSIS — Z91.013 ALLERGY TO SHELLFISH: ICD-10-CM

## 2019-06-11 DIAGNOSIS — Z91.018 ALLERGY TO TREE NUTS: ICD-10-CM

## 2019-06-11 DIAGNOSIS — T78.1XXA ALLERGIC REACTION TO FOOD, INITIAL ENCOUNTER: Primary | ICD-10-CM

## 2019-06-11 DIAGNOSIS — Z91.030 ALLERGIC TO BEES: ICD-10-CM

## 2019-06-11 LAB
A/G RATIO: 1.5 (ref 1.1–2.2)
ALBUMIN SERPL-MCNC: 4.4 G/DL (ref 3.4–5)
ALP BLD-CCNC: 111 U/L (ref 40–129)
ALT SERPL-CCNC: 19 U/L (ref 10–40)
ANION GAP SERPL CALCULATED.3IONS-SCNC: 14 MMOL/L (ref 3–16)
AST SERPL-CCNC: 19 U/L (ref 15–37)
BILIRUB SERPL-MCNC: <0.2 MG/DL (ref 0–1)
BUN BLDV-MCNC: 14 MG/DL (ref 7–20)
CALCIUM SERPL-MCNC: 9.7 MG/DL (ref 8.3–10.6)
CHLORIDE BLD-SCNC: 102 MMOL/L (ref 99–110)
CHOLESTEROL, TOTAL: 279 MG/DL (ref 0–199)
CO2: 24 MMOL/L (ref 21–32)
CREAT SERPL-MCNC: 0.8 MG/DL (ref 0.6–1.2)
GFR AFRICAN AMERICAN: >60
GFR NON-AFRICAN AMERICAN: >60
GLOBULIN: 2.9 G/DL
GLUCOSE BLD-MCNC: 83 MG/DL (ref 70–99)
HDLC SERPL-MCNC: 74 MG/DL (ref 40–60)
LDL CHOLESTEROL CALCULATED: 192 MG/DL
POTASSIUM SERPL-SCNC: 4.5 MMOL/L (ref 3.5–5.1)
SODIUM BLD-SCNC: 140 MMOL/L (ref 136–145)
TOTAL PROTEIN: 7.3 G/DL (ref 6.4–8.2)
TRIGL SERPL-MCNC: 63 MG/DL (ref 0–150)
VITAMIN D 25-HYDROXY: 14.4 NG/ML
VLDLC SERPL CALC-MCNC: 13 MG/DL

## 2019-06-11 PROCEDURE — 99214 OFFICE O/P EST MOD 30 MIN: CPT | Performed by: INTERNAL MEDICINE

## 2019-06-11 RX ORDER — LORATADINE 10 MG/1
10 TABLET ORAL DAILY
Qty: 30 TABLET | Refills: 0 | Status: SHIPPED | OUTPATIENT
Start: 2019-06-11

## 2019-06-11 RX ORDER — RANITIDINE HCL 75 MG
75 TABLET ORAL 2 TIMES DAILY
Qty: 10 TABLET | Refills: 0 | COMMUNITY
Start: 2019-06-11 | End: 2021-07-09

## 2019-06-11 RX ORDER — METHYLPREDNISOLONE 4 MG/1
TABLET ORAL
Qty: 1 KIT | Refills: 0 | Status: SHIPPED | OUTPATIENT
Start: 2019-06-11 | End: 2019-06-17

## 2019-06-11 RX ORDER — EPINEPHRINE 0.3 MG/.3ML
0.3 INJECTION SUBCUTANEOUS ONCE
Qty: 1 EACH | Refills: 3 | Status: SHIPPED | OUTPATIENT
Start: 2019-06-11 | End: 2022-10-18 | Stop reason: SDUPTHER

## 2019-06-11 ASSESSMENT — ENCOUNTER SYMPTOMS
CHEST TIGHTNESS: 0
RHINORRHEA: 0
CONSTIPATION: 1
VOMITING: 0
COUGH: 0
BLOOD IN STOOL: 0
WHEEZING: 0
ABDOMINAL PAIN: 0
FACIAL SWELLING: 1
NAUSEA: 0
SINUS PRESSURE: 0
SORE THROAT: 0
SHORTNESS OF BREATH: 0
DIARRHEA: 0

## 2019-06-11 ASSESSMENT — PATIENT HEALTH QUESTIONNAIRE - PHQ9
SUM OF ALL RESPONSES TO PHQ QUESTIONS 1-9: 0
2. FEELING DOWN, DEPRESSED OR HOPELESS: 0
1. LITTLE INTEREST OR PLEASURE IN DOING THINGS: 0
SUM OF ALL RESPONSES TO PHQ9 QUESTIONS 1 & 2: 0
SUM OF ALL RESPONSES TO PHQ QUESTIONS 1-9: 0

## 2019-06-11 NOTE — PROGRESS NOTES
Gray Lucero   Date ofBirth:  1960    Date of Visit:  6/11/2019    Chief Complaint   Patient presents with    Allergic Reaction     Patient fries at lunch yesterday and the fries may have cooked in fish grease    Hyperlipidemia    Other     Vitamin D deficiency , prediabetes, fasting for labs, wants Epi-Pen       HPI  Pt states she ate chicken wings and french fries yesterday. 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 Patient states she woke up in the middle of the night and her lips felt funny and then she went back to sleep and when she woke up her upper lip was swollen tight and her jaws on each side of her lip are puffy. Patient states she kept putting warm water on her lip. Patient states she took Children's Benadryl 12.5mg at 5:00am and none since. Patient states it didn't help. Patient states she can't take adult Benadryl because it makes her very drowsy and she doesn't want to sleep because she has work to do. Patient states Zyrtec makes her sleepy. Patient is allergic to tree nuts, seafood, and shellfish. Patient states she thinks the fries may have been cooked in the same grease as fish or the same utensil used because the restaurant she got the fries has fish on its menu. Patient denies shortness of breath, wheezing, rash, and throat tightness. Vitamin D deficiency- Pt states she stopped taking vitamin D 50,000 IU a while ago. Pt states it is hard to remember to take it once a week. Hyperlipidemia-Pt takes Simvastatin. Pt forgets to take it at night and ends up going to bed without taking it. Pt hasn't been decreasing fat and cholesterol. Pt walks 45 minutes 3 times per week. Prediabetes- Pt doesn't decrease carbohydrates. Constipation- Pt states she takes Dulcolax at night and drinks smooth move tea during the day.      Review of Systems   Constitutional: Negative for chills, fatigue and fever. HENT: Positive for facial swelling (lip swelling). Negative for congestion, postnasal drip, rhinorrhea, sinus pressure and sore throat. Eyes: Negative for visual disturbance. Respiratory: Negative for cough, chest tightness, shortness of breath and wheezing. Cardiovascular: Negative for chest pain, palpitations and leg swelling. Gastrointestinal: Positive for constipation. Negative for abdominal pain, blood in stool, diarrhea, nausea and vomiting. Genitourinary: Negative for dysuria, frequency and hematuria. Musculoskeletal: Negative for arthralgias and myalgias. Skin: Negative for rash. Neurological: Negative for dizziness, tremors, syncope, weakness, light-headedness, numbness and headaches. Psychiatric/Behavioral: Negative for dysphoric mood and sleep disturbance. The patient is not nervous/anxious. Allergies   Allergen Reactions    Bee Venom Swelling    Fish-Derived Products Swelling    Other Swelling     TREE NUTS    Shellfish Allergy Swelling    Tylenol [Acetaminophen]      Tylenol 3 makes pt itch     Outpatient Medications Marked as Taking for the 6/11/19 encounter (Office Visit) with Marisa Florez MD   Medication Sig Dispense Refill    simvastatin (ZOCOR) 40 MG tablet Take 1 tablet by mouth nightly 90 tablet 1    vitamin D (ERGOCALCIFEROL) 33717 units CAPS capsule Take 1 capsule by mouth once a week 4 capsule 3    psyllium (METAMUCIL) 0.52 g capsule Take 1.04 g by mouth daily      albuterol sulfate HFA (PROAIR HFA) 108 (90 Base) MCG/ACT inhaler Inhale 2 puffs into the lungs every 6 hours as needed for Wheezing 1 Inhaler 0    loratadine (CLARITIN) 10 MG tablet Take 1 tablet by mouth daily.  30 tablet 3         Vitals:    06/11/19 1051   BP: 130/80   Site: Right Upper Arm   Position: Sitting   Cuff Size: Large Adult   Pulse: 82   Resp: 14   Temp: 98.1 °F (36.7 °C)   TempSrc: Oral   SpO2: 96%   Weight: 226 lb (102.5 kg)   Height: 5' 1\" (1.549 m)     Body mass index is 42.7 kg/m². Physical Exam   Constitutional: She is oriented to person, place, and time. She appears well-developed and well-nourished. No distress. HENT:   Mouth/Throat: Oropharynx is clear and moist and mucous membranes are normal.   Upper lip swelling   Eyes: Pupils are equal, round, and reactive to light. Conjunctivae, EOM and lids are normal.   Neck: Neck supple. Carotid bruit is not present. No thyromegaly present. Cardiovascular: Normal rate, regular rhythm, S1 normal, S2 normal and normal heart sounds. Exam reveals no gallop and no friction rub. No murmur heard. Pulmonary/Chest: Effort normal and breath sounds normal. No respiratory distress. She has no wheezes. She has no rhonchi. She has no rales. Abdominal: Soft. Normal appearance and bowel sounds are normal. She exhibits no distension. There is no hepatosplenomegaly. There is no tenderness. Musculoskeletal: She exhibits no edema. Lymphadenopathy:        Head (right side): No submandibular adenopathy present. Head (left side): No submandibular adenopathy present. Neurological: She is alert and oriented to person, place, and time. Psychiatric: She has a normal mood and affect. Nursing note reviewed. No results found for this visit on 06/11/19.   Lab Review   Hospital Outpatient Visit on 12/24/2018   Component Date Value    Sodium 12/24/2018 143     Potassium 12/24/2018 3.8     Chloride 12/24/2018 104     CO2 12/24/2018 26     Anion Gap 12/24/2018 13     Glucose 12/24/2018 86     BUN 12/24/2018 11     CREATININE 12/24/2018 0.8     GFR Non- 12/24/2018 >60     GFR  12/24/2018 >60     Calcium 12/24/2018 9.3     Total Protein 12/24/2018 7.1     Alb 12/24/2018 4.1     Albumin/Globulin Ratio 12/24/2018 1.4     Total Bilirubin 12/24/2018 0.3     Alkaline Phosphatase 12/24/2018 93     ALT 12/24/2018 20     AST 12/24/2018 19     Globulin 12/24/2018 3.0     Cholesterol, Total 12/24/2018 242*    Triglycerides 12/24/2018 75     HDL 12/24/2018 62*    LDL Calculated 12/24/2018 165*    VLDL Cholesterol Calcula* 12/24/2018 15     Vit D, 25-Hydroxy 12/24/2018 13.4*    TSH 12/24/2018 1.54     Hemoglobin A1C 12/24/2018 6.0     eAG 12/24/2018 125.5          Assessment/Plan     1. Allergic reaction to food, initial encounter  - methylPREDNISolone (MEDROL DOSEPACK) 4 MG tablet; Take by mouth. Dispense: 1 kit; Refill: 0  - ranitidine (ZANTAC) 75 MG tablet; Take 1 tablet by mouth 2 times daily  Dispense: 10 tablet; Refill: 0  - loratadine (CLARITIN) 10 MG tablet; Take 1 tablet by mouth daily  Dispense: 30 tablet; Refill: 0    2. Lip swelling  - due to allergic reaction  - methylPREDNISolone (MEDROL DOSEPACK) 4 MG tablet; Take by mouth. Dispense: 1 kit; Refill: 0  - loratadine (CLARITIN) 10 MG tablet; Take 1 tablet by mouth daily  Dispense: 30 tablet; Refill: 0    3. Mixed hyperlipidemia  - Patient is not compliant with Simvastatin due to night time dosing  - Will change Simvastatin after labs done and resulted  -Low fat, low cholesterol diet  -Regular aerobic exercise  - Comprehensive Metabolic Panel; Future  - Lipid Panel; Future    4. Vitamin D deficiency  -Patient is not taking vitamin D   -Patient may need to resume vitamin D depending on results  - Vitamin D 25 Hydroxy; Future    5. Prediabetes  -Low carbohydrate diet  -Regular aerobic exercise  - Hemoglobin A1C; Future    6. 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 Allergy to shellfish  - EPINEPHrine (EPIPEN 2-MELANIE) 0.3 MG/0.3ML SOAJ injection;  Inject 0.3 mLs into the skin once for 1 dose Use as directed for allergic reaction  Dispense: 1 each; Refill: 3      Discussed medications with patient, who voiced understanding of their use and indications. All questions answered. Return in about 1 week (around 6/18/2019) for chronic medical conditions and results.

## 2019-06-11 NOTE — TELEPHONE ENCOUNTER
Answer Assessment - Initial Assessment Questions  1. ONSET: \"When did the swelling start? \" (e.g., minutes, hours, days)      overnight  2. LOCATION: \"What part of the face is swollen? \"  Right upper lip/right side of mouth  3. SEVERITY: \"How swollen is it? \"      Tight swollen  4. ITCHING: \"Is there any itching? \" If so, ask: \"How much? \"   (Scale 1-10; mild, moderate or severe)      no  5. PAIN: \"Is the swelling painful to touch? \" If so, ask: \"How painful is it? \"   (Scale 1-10; mild, moderate or severe)    \"tight\"  6. FEVER: \"Do you have a fever? \" If so, ask: \"What is it, how was it measured, and when did it start? \"   No fever  7. CAUSE: \"What do you think is causing the face swelling? \"     [place] fries fried in same oil as fish  8. RECURRENT SYMPTOM: \"Have you had face swelling before? \" If so, ask: \"When was the last time? \" \"What happened that time? \"      This has happened before and resolves with steroid dose pack or injection  9. OTHER SYMPTOMS: \"Do you have any other symptoms? \"no other s/s. No throat itch or swelling.  No difficulty swallowing    Protocols used: [facility]

## 2019-06-11 NOTE — TELEPHONE ENCOUNTER
Call rec'd from [facility]      Upper lip right and side of mouth swelling  Pt reports seafood allergy    States seafood allergy and last night at restaurant she believes french fries or wings were fried in same oil as fish had been fried in. States this has happened before     Took benadryl about 0500 opal dose of 12.5 mg (because adult dose puts her to sleep and she is unable to function)    Tried ice--but warm compress is helping     In the past has rec'd a steroid injection or medrol dose pack which has resolved it    conferenced in Dr. Caldwell Kid office and patient was scheduled for 10:45 am today to be evaluated    Reason for Disposition   Mild facial swelling from food reaction and diagnosis never confirmed by a physician    Protocols used: [facility]    Protocol error:  Fish allergy is a known allergy confirmed by diagnosis. Was unable to edit this disposition.

## 2019-06-11 NOTE — PATIENT INSTRUCTIONS
1. Allergic reaction to food, initial encounter  - methylPREDNISolone (MEDROL DOSEPACK) 4 MG tablet; Take by mouth. Dispense: 1 kit; Refill: 0  - ranitidine (ZANTAC) 75 MG tablet; Take 1 tablet by mouth 2 times daily  Dispense: 10 tablet; Refill: 0  - loratadine (CLARITIN) 10 MG tablet; Take 1 tablet by mouth daily  Dispense: 30 tablet; Refill: 0    2. Lip swelling  - due to allergic reaction  - methylPREDNISolone (MEDROL DOSEPACK) 4 MG tablet; Take by mouth. Dispense: 1 kit; Refill: 0  - loratadine (CLARITIN) 10 MG tablet; Take 1 tablet by mouth daily  Dispense: 30 tablet; Refill: 0    3. Mixed hyperlipidemia  - Patient is not compliant with Simvastatin due to night time dosing  - Will change Simvastatin after labs done and resulted  -Low fat, low cholesterol diet  -Regular aerobic exercise  - Comprehensive Metabolic Panel; Future  - Lipid Panel; Future    4. Vitamin D deficiency  -Patient is not taking vitamin D   -Patient may need to resume vitamin D depending on results  - Vitamin D 25 Hydroxy; Future    5. Prediabetes  -Low carbohydrate diet  -Regular aerobic exercise  - Hemoglobin A1C; Future    6. 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 Allergy to shellfish  - EPINEPHrine (EPIPEN 2-MELANIE) 0.3 MG/0.3ML SOAJ injection; Inject 0.3 mLs into the skin once for 1 dose Use as directed for allergic reaction  Dispense: 1 each;  Refill: 3

## 2019-06-11 NOTE — LETTER
[address]  [address] [address]  Phone: [phone]  Fax: [phone]    Lito Hutchison MD        June 11, 2019     Patient: Brenda Finney   YOB: 1960   Date of Visit: 6/11/2019       To Whom It May Concern:    Please excuse Barbara Tapia from work 6/11/19 due to illness. She may return to work on 6/12/19. If you have any questions or concerns, please don't hesitate to call.     Sincerely,          Lito Hutchison MD

## 2019-06-12 LAB
ESTIMATED AVERAGE GLUCOSE: 128.4 MG/DL
HBA1C MFR BLD: 6.1 %

## 2019-07-02 ENCOUNTER — OFFICE VISIT (OUTPATIENT)
Dept: INTERNAL MEDICINE CLINIC | Age: 59
End: 2019-07-02
Payer: COMMERCIAL

## 2019-07-02 VITALS
HEART RATE: 83 BPM | SYSTOLIC BLOOD PRESSURE: 110 MMHG | BODY MASS INDEX: 42.14 KG/M2 | OXYGEN SATURATION: 98 % | WEIGHT: 223.2 LBS | HEIGHT: 61 IN | DIASTOLIC BLOOD PRESSURE: 68 MMHG

## 2019-07-02 DIAGNOSIS — R73.03 PREDIABETES: ICD-10-CM

## 2019-07-02 DIAGNOSIS — T78.1XXA ALLERGIC REACTION TO FOOD, INITIAL ENCOUNTER: Primary | ICD-10-CM

## 2019-07-02 DIAGNOSIS — E55.9 VITAMIN D DEFICIENCY: ICD-10-CM

## 2019-07-02 DIAGNOSIS — E78.2 MIXED HYPERLIPIDEMIA: ICD-10-CM

## 2019-07-02 PROCEDURE — 99214 OFFICE O/P EST MOD 30 MIN: CPT | Performed by: INTERNAL MEDICINE

## 2019-07-02 RX ORDER — ROSUVASTATIN CALCIUM 10 MG/1
10 TABLET, COATED ORAL NIGHTLY
Qty: 30 TABLET | Refills: 3 | Status: SHIPPED | OUTPATIENT
Start: 2019-07-02 | End: 2019-10-11 | Stop reason: SDUPTHER

## 2019-07-02 RX ORDER — ERGOCALCIFEROL 1.25 MG/1
50000 CAPSULE ORAL WEEKLY
Qty: 4 CAPSULE | Refills: 5 | Status: SHIPPED | OUTPATIENT
Start: 2019-07-02 | End: 2021-07-01

## 2019-07-02 ASSESSMENT — ENCOUNTER SYMPTOMS
VOMITING: 0
DIARRHEA: 0
NAUSEA: 0
SORE THROAT: 0
ABDOMINAL PAIN: 0
CONSTIPATION: 0
SHORTNESS OF BREATH: 0
COUGH: 0

## 2019-07-02 NOTE — PROGRESS NOTES
Swelling    Fish-Derived Products Swelling    Other Swelling     TREE NUTS    Shellfish Allergy Swelling    Tylenol [Acetaminophen]      Tylenol 3 makes pt itch     Outpatient Medications Marked as Taking for the 7/2/19 encounter (Office Visit) with Zane Macdonald MD   Medication Sig Dispense Refill    ranitidine (ZANTAC) 75 MG tablet Take 1 tablet by mouth 2 times daily 10 tablet 0    loratadine (CLARITIN) 10 MG tablet Take 1 tablet by mouth daily 30 tablet 0    simvastatin (ZOCOR) 40 MG tablet Take 1 tablet by mouth nightly 90 tablet 1    vitamin D (ERGOCALCIFEROL) 55782 units CAPS capsule Take 1 capsule by mouth once a week 4 capsule 3    psyllium (METAMUCIL) 0.52 g capsule Take 1.04 g by mouth daily      albuterol sulfate HFA (PROAIR HFA) 108 (90 Base) MCG/ACT inhaler Inhale 2 puffs into the lungs every 6 hours as needed for Wheezing 1 Inhaler 0         Vitals:    07/02/19 1144   BP: 110/68   Site: Right Upper Arm   Position: Sitting   Cuff Size: Large Adult   Pulse: 83   SpO2: 98%   Weight: 223 lb 3.2 oz (101.2 kg)   Height: 5' 1\" (1.549 m)     Body mass index is 42.17 kg/m². Physical Exam   Constitutional: She is oriented to person, place, and time. She appears well-developed and well-nourished. No distress. HENT:   Mouth/Throat: Oropharynx is clear and moist and mucous membranes are normal.   Eyes: Pupils are equal, round, and reactive to light. Conjunctivae, EOM and lids are normal.   Neck: Neck supple. Carotid bruit is not present. No thyromegaly present. Cardiovascular: Normal rate, regular rhythm, S1 normal, S2 normal and normal heart sounds. Exam reveals no gallop and no friction rub. No murmur heard. Pulmonary/Chest: Effort normal and breath sounds normal. No respiratory distress. She has no wheezes. She has no rhonchi. She has no rales. Abdominal: Soft. Normal appearance and bowel sounds are normal. She exhibits no distension. There is no hepatosplenomegaly.  There is no

## 2019-07-02 NOTE — PATIENT INSTRUCTIONS
beef liver. These foods have vitamin D in small amounts. · Milk, soy drinks, orange juice, yogurt, margarine, and some kinds of cereal have vitamin D added to them. Some people don't make vitamin D as well as others. They may have to take extra care in getting enough vitamin D. Things that reduce how much vitamin D your body makes include:  · Dark skin, such as many  Americans have. · Age, especially if you are older than 72. · Digestive problems, such as Crohn's or celiac disease. · Liver and kidney disease. Some people who do not get enough vitamin D may need supplements. Are there any risks from taking vitamin D?  · Too much vitamin D:  ? Can damage your kidneys. ? Can cause nausea and vomiting, constipation, and weakness. ? Raises the amount of calcium in your blood. If this happens, you can get confused or have an irregular heart rhythm. · Vitamin D may interact with other medicines. Tell your doctor about all of the medicines you take, including over-the-counter drugs, herbs, and pills. Tell your doctor about all of your current medical problems. Where can you learn more? Go to https://[employer]. org and sign in to your [employer] account. Enter [id number] in the [facility] box to learn more about \"Learning About Vitamin D. \"     If you do not have an account, please click on the \"Sign Up Now\" link. Current as of: November 7, 2018  Content Version: 12.0  © 1178-4396 [employer]. Care instructions adapted under license by [facility] ([facility]). If you have questions about a medical condition or this instruction, always ask your healthcare professional. [place] [address] any warranty or liability for your use of this information.

## 2019-07-09 ASSESSMENT — ENCOUNTER SYMPTOMS
BLOOD IN STOOL: 0
SINUS PRESSURE: 0
CHEST TIGHTNESS: 0
WHEEZING: 0
RHINORRHEA: 0

## 2019-10-11 DIAGNOSIS — E78.2 MIXED HYPERLIPIDEMIA: ICD-10-CM

## 2019-10-11 RX ORDER — ROSUVASTATIN CALCIUM 10 MG/1
TABLET, COATED ORAL
Qty: 90 TABLET | Refills: 1 | Status: SHIPPED | OUTPATIENT
Start: 2019-10-11 | End: 2021-07-09

## 2020-01-13 ENCOUNTER — HOSPITAL ENCOUNTER (OUTPATIENT)
Dept: WOMENS IMAGING | Age: 60
Discharge: HOME | End: 2020-01-13
Payer: COMMERCIAL

## 2020-01-13 PROCEDURE — 77067 SCR MAMMO BI INCL CAD: CPT

## 2020-06-19 ENCOUNTER — HOSPITAL ENCOUNTER (OUTPATIENT)
Dept: MRI IMAGING | Age: 60
Discharge: HOME | End: 2020-06-19
Payer: COMMERCIAL

## 2020-06-19 PROCEDURE — 72148 MRI LUMBAR SPINE W/O DYE: CPT

## 2021-06-07 ENCOUNTER — HOSPITAL ENCOUNTER (OUTPATIENT)
Dept: WOMENS IMAGING | Age: 61
Discharge: HOME | End: 2021-06-07
Payer: COMMERCIAL

## 2021-06-07 DIAGNOSIS — Z12.31 VISIT FOR SCREENING MAMMOGRAM: ICD-10-CM

## 2021-06-07 PROCEDURE — 77067 SCR MAMMO BI INCL CAD: CPT

## 2021-07-01 ENCOUNTER — OFFICE VISIT (OUTPATIENT)
Dept: PRIMARY CARE CLINIC | Age: 61
End: 2021-07-01
Payer: COMMERCIAL

## 2021-07-01 VITALS
RESPIRATION RATE: 16 BRPM | DIASTOLIC BLOOD PRESSURE: 87 MMHG | OXYGEN SATURATION: 99 % | HEIGHT: 61 IN | TEMPERATURE: 97.7 F | WEIGHT: 232 LBS | SYSTOLIC BLOOD PRESSURE: 130 MMHG | HEART RATE: 68 BPM | BODY MASS INDEX: 43.8 KG/M2

## 2021-07-01 DIAGNOSIS — E55.9 VITAMIN D DEFICIENCY: ICD-10-CM

## 2021-07-01 DIAGNOSIS — F32.A DEPRESSION, UNSPECIFIED DEPRESSION TYPE: ICD-10-CM

## 2021-07-01 DIAGNOSIS — E78.2 MIXED HYPERLIPIDEMIA: ICD-10-CM

## 2021-07-01 DIAGNOSIS — N95.1 MENOPAUSAL SYMPTOMS: ICD-10-CM

## 2021-07-01 DIAGNOSIS — R53.83 FATIGUE, UNSPECIFIED TYPE: ICD-10-CM

## 2021-07-01 DIAGNOSIS — R73.03 PREDIABETES: ICD-10-CM

## 2021-07-01 DIAGNOSIS — F51.01 PRIMARY INSOMNIA: ICD-10-CM

## 2021-07-01 DIAGNOSIS — R06.02 SHORTNESS OF BREATH: ICD-10-CM

## 2021-07-01 DIAGNOSIS — R68.82 DECREASED LIBIDO: ICD-10-CM

## 2021-07-01 DIAGNOSIS — E78.2 MIXED HYPERLIPIDEMIA: Primary | ICD-10-CM

## 2021-07-01 DIAGNOSIS — G47.33 OBSTRUCTIVE SLEEP APNEA: ICD-10-CM

## 2021-07-01 LAB
A/G RATIO: 1.5 (ref 1.1–2.2)
ALBUMIN SERPL-MCNC: 4.2 G/DL (ref 3.4–5)
ALP BLD-CCNC: 109 U/L (ref 40–129)
ALT SERPL-CCNC: 22 U/L (ref 10–40)
ANION GAP SERPL CALCULATED.3IONS-SCNC: 8 MMOL/L (ref 3–16)
AST SERPL-CCNC: 21 U/L (ref 15–37)
BASOPHILS ABSOLUTE: 0 K/UL (ref 0–0.2)
BASOPHILS RELATIVE PERCENT: 0.6 %
BILIRUB SERPL-MCNC: 0.4 MG/DL (ref 0–1)
BUN BLDV-MCNC: 10 MG/DL (ref 7–20)
CALCIUM SERPL-MCNC: 9.6 MG/DL (ref 8.3–10.6)
CHLORIDE BLD-SCNC: 103 MMOL/L (ref 99–110)
CHOLESTEROL, TOTAL: 265 MG/DL (ref 0–199)
CO2: 28 MMOL/L (ref 21–32)
CREAT SERPL-MCNC: 0.8 MG/DL (ref 0.6–1.2)
EOSINOPHILS ABSOLUTE: 0.1 K/UL (ref 0–0.6)
EOSINOPHILS RELATIVE PERCENT: 2 %
GFR AFRICAN AMERICAN: >60
GFR NON-AFRICAN AMERICAN: >60
GLOBULIN: 2.8 G/DL
GLUCOSE BLD-MCNC: 85 MG/DL (ref 70–99)
HCT VFR BLD CALC: 39.3 % (ref 36–48)
HDLC SERPL-MCNC: 68 MG/DL (ref 40–60)
HEMOGLOBIN: 12.6 G/DL (ref 12–16)
LDL CHOLESTEROL CALCULATED: 181 MG/DL
LYMPHOCYTES ABSOLUTE: 1.8 K/UL (ref 1–5.1)
LYMPHOCYTES RELATIVE PERCENT: 27.1 %
MCH RBC QN AUTO: 26.6 PG (ref 26–34)
MCHC RBC AUTO-ENTMCNC: 31.9 G/DL (ref 31–36)
MCV RBC AUTO: 83.4 FL (ref 80–100)
MONOCYTES ABSOLUTE: 0.7 K/UL (ref 0–1.3)
MONOCYTES RELATIVE PERCENT: 11.3 %
NEUTROPHILS ABSOLUTE: 3.9 K/UL (ref 1.7–7.7)
NEUTROPHILS RELATIVE PERCENT: 59 %
PDW BLD-RTO: 14.2 % (ref 12.4–15.4)
PLATELET # BLD: 369 K/UL (ref 135–450)
PMV BLD AUTO: 8.1 FL (ref 5–10.5)
POTASSIUM SERPL-SCNC: 4.3 MMOL/L (ref 3.5–5.1)
RBC # BLD: 4.71 M/UL (ref 4–5.2)
SODIUM BLD-SCNC: 139 MMOL/L (ref 136–145)
TOTAL PROTEIN: 7 G/DL (ref 6.4–8.2)
TRIGL SERPL-MCNC: 79 MG/DL (ref 0–150)
TSH SERPL DL<=0.05 MIU/L-ACNC: 1.22 UIU/ML (ref 0.27–4.2)
VITAMIN B-12: 1522 PG/ML (ref 211–911)
VITAMIN D 25-HYDROXY: 28.1 NG/ML
VLDLC SERPL CALC-MCNC: 16 MG/DL
WBC # BLD: 6.6 K/UL (ref 4–11)

## 2021-07-01 PROCEDURE — 99214 OFFICE O/P EST MOD 30 MIN: CPT | Performed by: INTERNAL MEDICINE

## 2021-07-01 RX ORDER — LANOLIN ALCOHOL/MO/W.PET/CERES
1000 CREAM (GRAM) TOPICAL DAILY
COMMUNITY

## 2021-07-01 RX ORDER — VIT C/B6/B5/MAGNESIUM/HERB 173 50-5-6-5MG
CAPSULE ORAL DAILY
COMMUNITY

## 2021-07-01 RX ORDER — VITAMIN B COMPLEX
1 CAPSULE ORAL DAILY
COMMUNITY

## 2021-07-01 RX ORDER — PIMECROLIMUS 10 MG/G
CREAM TOPICAL
COMMUNITY
Start: 2021-03-25 | End: 2022-10-18

## 2021-07-01 SDOH — ECONOMIC STABILITY: FOOD INSECURITY: WITHIN THE PAST 12 MONTHS, YOU WORRIED THAT YOUR FOOD WOULD RUN OUT BEFORE YOU GOT MONEY TO BUY MORE.: NEVER TRUE

## 2021-07-01 SDOH — ECONOMIC STABILITY: FOOD INSECURITY: WITHIN THE PAST 12 MONTHS, THE FOOD YOU BOUGHT JUST DIDN'T LAST AND YOU DIDN'T HAVE MONEY TO GET MORE.: NEVER TRUE

## 2021-07-01 ASSESSMENT — SOCIAL DETERMINANTS OF HEALTH (SDOH): HOW HARD IS IT FOR YOU TO PAY FOR THE VERY BASICS LIKE FOOD, HOUSING, MEDICAL CARE, AND HEATING?: NOT HARD AT ALL

## 2021-07-01 ASSESSMENT — PATIENT HEALTH QUESTIONNAIRE - PHQ9
SUM OF ALL RESPONSES TO PHQ QUESTIONS 1-9: 0
SUM OF ALL RESPONSES TO PHQ9 QUESTIONS 1 & 2: 0
SUM OF ALL RESPONSES TO PHQ QUESTIONS 1-9: 0
2. FEELING DOWN, DEPRESSED OR HOPELESS: 0
1. LITTLE INTEREST OR PLEASURE IN DOING THINGS: 0
SUM OF ALL RESPONSES TO PHQ QUESTIONS 1-9: 0

## 2021-07-01 NOTE — PROGRESS NOTES
Ami Lucero   Date ofBirth:  1960    Date of Visit:  7/1/2021    Chief Complaint   Patient presents with   [address] [place] [address] to talk about hormone labs     Hyperlipidemia    Fatigue    Other     vitamin D and prediabetes       HPI  Patient has hyperlipidemia. Patient last seen 2 years ago and ran out of cholesterol medication. Patient states she was walking 10,000 steps per day until 2 months ago. Patient has vitamin D deficiency. Patient takes over-the-counter vitamin D3 and states she thinks it is 1000 IU once daily. Patient complaints of fatigue. Patient states she wakes up tired and has no energy. Patient takes a multivitamin once daily. Patient has prediabetes. Patient patient has not been decreasing carbohydrates. Patient has been working from home. Patient states she has gained weight. Patient states she only eats once a day. Patient complaints of insomnia. Patient states she doesn't sleep and wakes up frequently. Patient states she works until 1:00am and it takes 30 minutes to get home. Patient states by the time she gets home and unwinds it is 2:30am. Patient works 2:30pm until 12:30 or 1:00am. Patient doesn't want to try anything for sleep. Patient has sleep apnea. Patient states she had a CPAP machine and took it back due to stress. Patient states she felt like she couldn't breath and would wake up choking and coughing when she used the CPAP. Patient complains of depression. Patient states she has a lot going on. Patient states she has tension. Patient states she is not together with  any more the past 4 years. Patient states he pays half the bills and lives his own life. Patient states she is working a lot. Patient states she is sad, lonely, and tearful. Patient states she all she does is work and walks all the time. Patient states she is sad and depressed and can't move forward. Patient states she feels stuck.  Patient states she doesn't do anything fun. Patient states she has plans but cannot get motivated to do anything. Patient states she sits in her room and feels claustrophobic. Patient does not want medication. Patient states she is interested in bioidentical hormone replacement. Patient plans to see a Hormone Replacement Doctor. Patient states she no motivation, weight gain, and no sex drive. Patient denies hot flashes. Patient had a hysterectomy in 2002 and took Premarin for 15 years. Patient states with the weight gain she has shortness of breath with moving around and going up and down stairs. Patient states she has seen orthopedics for Achilles tendinitis and bone spurs. Patient states she had pain left 4th and 5th fingers 2 weeks ago that has improved. Patient states she is getting treatment from a chiropractor for her back, hips, and shoulders          Wt Readings from Last 3 Encounters:   07/01/21 232 lb (105.2 kg)   07/02/19 223 lb 3.2 oz (101.2 kg)   06/11/19 226 lb (102.5 kg)         Review of Systems   Constitutional: Positive for fatigue and unexpected weight change. Negative for activity change, appetite change, chills and fever. HENT: Negative for congestion, postnasal drip, rhinorrhea, sinus pressure, sinus pain, sneezing and sore throat. Eyes: Negative for visual disturbance. Respiratory: Positive for shortness of breath. Negative for cough, chest tightness and wheezing. Cardiovascular: Negative for chest pain, palpitations and leg swelling. Gastrointestinal: Negative for abdominal pain, blood in stool, constipation, diarrhea, nausea and vomiting. Genitourinary: Negative for dysuria, frequency and hematuria. Musculoskeletal: Positive for arthralgias and back pain. Negative for myalgias. Skin: Negative for rash. Neurological: Negative for dizziness, tremors, syncope, weakness, light-headedness, numbness and headaches.    Psychiatric/Behavioral: Positive for dysphoric mood and sleep disturbance. Negative for decreased concentration. The patient is not nervous/anxious. Allergies   Allergen Reactions    Bee Venom Swelling    Fish-Derived Products Swelling    Other Swelling     TREE NUTS    Shellfish Allergy Swelling    Tylenol [Acetaminophen]      Tylenol 3 makes pt itch     Outpatient Medications Marked as Taking for the 7/1/21 encounter (Office Visit) with Lawyer Rekha MD   Medication Sig Dispense Refill    VITAMIN D, CHOLECALCIFEROL, PO ergocalciferol (vitamin D2) 1,250 mcg (50,000 unit) capsule      pimecrolimus (ELIDEL) 1 % cream       Turmeric 500 MG CAPS Take by mouth daily      b complex vitamins capsule Take 1 capsule by mouth daily      vitamin B-12 (CYANOCOBALAMIN) 1000 MCG tablet Take 1,000 mcg by mouth daily      ELDERBERRY PO Take by mouth      loratadine (CLARITIN) 10 MG tablet Take 1 tablet by mouth daily 30 tablet 0    EPINEPHrine (EPIPEN 2-MELANIE) 0.3 MG/0.3ML SOAJ injection Inject 0.3 mLs into the skin once for 1 dose Use as directed for allergic reaction 1 each 3         Vitals:    07/01/21 1010   BP: 130/87   Pulse: 68   Resp: 16   Temp: 97.7 °F (36.5 °C)   SpO2: 99%   Weight: 232 lb (105.2 kg)   Height: 5' 1\" (1.549 m)     Body mass index is 43.84 kg/m². Physical Exam  Nursing note reviewed. Constitutional:       General: She is not in acute distress. Appearance: Normal appearance. She is well-developed. HENT:      Mouth/Throat:      Pharynx: Oropharynx is clear. Eyes:      General: Lids are normal.      Extraocular Movements: Extraocular movements intact. Conjunctiva/sclera: Conjunctivae normal.      Pupils: Pupils are equal, round, and reactive to light. Neck:      Thyroid: No thyromegaly. Vascular: No carotid bruit. Cardiovascular:      Rate and Rhythm: Normal rate and regular rhythm. Heart sounds: Normal heart sounds, S1 normal and S2 normal. No murmur heard. No friction rub. No gallop.     Pulmonary:      Effort: Pulmonary effort is normal. No respiratory distress. Breath sounds: Normal breath sounds. No wheezing, rhonchi or rales. Abdominal:      General: Bowel sounds are normal. There is no distension. Palpations: Abdomen is soft. Tenderness: There is no abdominal tenderness. Musculoskeletal:      Cervical back: Neck supple. Right lower leg: No edema. Left lower leg: No edema. Lymphadenopathy:      Head:      Right side of head: No submandibular adenopathy. Left side of head: No submandibular adenopathy. Neurological:      Mental Status: She is alert and oriented to person, place, and time. Psychiatric:         Mood and Affect: Affect is tearful. No results found for this visit on 07/01/21. Lab Review         Assessment/Plan     1. Mixed hyperlipidemia  -patient has been off cholesterol medication   -Patient may need to resume cholesterol lowering medication based on results  -Low fat, low cholesterol diet  -Regular aerobic exercise  -Lipid Panel; Future     2. Vitamin D deficiency  -patient has been off prescription vitamin D and reports taking over the counter vitamin D  - Vitamin D 25 Hydroxy; Future    3. Fatigue, unspecified type  - TSH without Reflex; Future  - CBC Auto Differential; Future  - Comprehensive Metabolic Panel; Future  - Vitamin B12; Future  - Vitamin D 25 Hydroxy; Future  -multivitamin once daily  -Regular aerobic exercise    4. Prediabetes   -Low carbohydrate diet  -Regular aerobic exercise  - HEMOGLOBIN A1C; Future    5. Primary insomnia  -patient declines medication for sleep  -patient can try over the counter Melatonin for sleep    6. Depression, unspecified depression type  -patient declines medication  - Amb External Referral To Counseling Services    7. Decreased libido  -can try over the counter Estroven or Reena Ban for menopausal symptoms  -patient is considering bioidentical hormone replacement     8.  Obstructive sleep apnea  - Referral to Jose Antonio Goodwin MD, Sleep Medicine, [facility]  - Vitamin D 25 Hydroxy; Future    9. Menopausal symptoms  -can try over the counter Estroven or [place] for menopausal symptoms  -patient is considering bioidentical hormone replacement    10. Shortness of breath  -Patient attributes shortness of breath to weight gain   -Patient declines chest xray      Discussed medications with patient, who voiced understanding of their use and indications. All questions answered.       Return in about 4 weeks (around 7/29/2021) for annual physical exam.

## 2021-07-01 NOTE — PATIENT INSTRUCTIONS
-Low fat, low cholesterol diet  -low carbohydrate diet  -Regular aerobic exercise  -multivitamin once daily  -continue vitamin D 1,000 IU once daily and may need higher dose depending on results  -Referral to Sleep Medicine  -can try over the counter Iram Cornfield or [place] for menopausal symptoms          Patient Education        Recovering From Depression: Care Instructions  Your Care Instructions     Taking good care of yourself is important as you recover from depression. In time, your symptoms will fade as your treatment takes hold. Do not give up. Instead, focus your energy on getting better. Your mood will improve. It just takes some time. Focus on things that can help you feel better, such as being with friends and family, eating well, and getting enough rest. But take things slowly. Do not do too much too soon. You will begin to feel better gradually. Follow-up care is a key part of your treatment and safety. Be sure to make and go to all appointments, and call your doctor if you are having problems. It's also a good idea to know your test results and keep a list of the medicines you take. How can you care for yourself at home? Be realistic  · If you have a large task to do, break it up into smaller steps you can handle, and just do what you can. · You may want to put off important decisions until your depression has lifted. If you have plans that will have a major impact on your life, such as marriage, divorce, or a job change, try to wait a bit. Talk it over with friends and loved ones who can help you look at the overall picture first.  · Reaching out to people for help is important. Do not isolate yourself. Let your family and friends help you. Find someone you can trust and confide in, and talk to that person. · Be patient, and be kind to yourself. Remember that depression is not your fault and is not something you can overcome with willpower alone.  Treatment is important for depression, just like for any other illness. Feeling better takes time, and your mood will improve little by little. Stay active  · Stay busy and get outside. Take a walk, or try some other light exercise. · Talk with your doctor about an exercise program. Exercise can help with mild depression. · Go to a movie or concert. Take part in a Jain activity or other social gathering. Go to a ball game. · Ask a friend to have dinner with you. Take care of yourself  · Eat a balanced diet with plenty of fresh fruits and vegetables, whole grains, and lean protein. If you have lost your appetite, eat small snacks rather than large meals. · Avoid using illegal drugs or marijuana and drinking alcohol. Do not take medicines that have not been prescribed for you. They may interfere with medicines you may be taking for depression, or they may make your depression worse. · Take your medicines exactly as they are prescribed. You may start to feel better within 1 to 3 weeks of taking antidepressant medicine. But it can take as many as 6 to 8 weeks to see more improvement. If you have questions or concerns about your medicines, or if you do not notice any improvement by 3 weeks, talk to your doctor. · Continue to take your medicine after your symptoms improve. Taking your medicine for at least 6 months after you feel better can help keep you from getting depressed again. If this isn't the first time you have been depressed, your doctor may recommend you to take medicine even longer. · If you have any side effects from your medicine, tell your doctor. Many side effects are mild and will go away on their own after you have been taking the medicine for a few weeks. Some may last longer. Talk to your doctor if side effects are bothering you too much. You might be able to try a different medicine. · Continue counseling. It may help prevent depression from returning, especially if you've had multiple episodes of depression.  Talk with your counselor if you are having a hard time attending your sessions or you think the sessions aren't working. Don't just stop going. · Get enough sleep. Talk to your doctor if you are having problems sleeping. · Avoid sleeping pills unless they are prescribed by the doctor treating your depression. Sleeping pills may make you groggy during the day, and they may interact with other medicine you are taking. · If you have any other illnesses, such as diabetes, heart disease, or high blood pressure, make sure to continue with your treatment. Tell your doctor about all of the medicines you take, including those with or without a prescription. · If you or someone you know talks about suicide, self-harm, or feeling hopeless, get help right away. Call the [facility] at 1-800-273-talk (4-920.415.9324) or text HOME to 938802 to access the [employer] Text Line. Consider saving these numbers in your phone. When should you call for help? Call 911 anytime you think you may need emergency care. For example, call if:    · You feel like hurting yourself or someone else.     · Someone you know has depression and is about to attempt or is attempting suicide. Call your doctor now or seek immediate medical care if:    · You hear voices.     · Someone you know has depression and:  ? Starts to give away his or her possessions. ? Uses illegal drugs or drinks alcohol heavily. ? Talks or writes about death, including writing suicide notes or talking about guns, knives, or pills. ? Starts to spend a lot of time alone. ? Acts very aggressively or suddenly appears calm. Watch closely for changes in your health, and be sure to contact your doctor if:    · You do not get better as expected. Where can you learn more? Go to https://epifanio.Revelation. org and sign in to your CrossFiber account. Enter Q315 in the Angiologix box to learn more about \"Recovering From Depression: Care Instructions. \"     If you do not have an account, please click on the \"Sign Up Now\" link. Current as of: September 23, 2020               Content Version: 12.9  © 2006-2021 Healthwise, Up[employer]sel. Care instructions adapted under license by [facility] ([facility]). If you have questions about a medical condition or this instruction, always ask your healthcare professional. [address] any warranty or liability for your use of this information. Patient Education        Insomnia: Care Instructions  Your Care Instructions     Insomnia is the inability to sleep well. It is a common problem for most people at some time. Insomnia may make it hard for you to get to sleep, stay asleep, or sleep as long as you need to. This can make you tired and grouchy during the day. It can also make you forgetful, less effective at work, and unhappy. Insomnia can be caused by conditions such as depression or anxiety. Pain can also affect your ability to sleep. When these problems are solved, the insomnia usually clears up. But sometimes bad sleep habits can cause insomnia. If insomnia is affecting your work or your enjoyment of life, you can take steps to improve your sleep. Follow-up care is a key part of your treatment and safety. Be sure to make and go to all appointments, and call your doctor if you are having problems. It's also a good idea to know your test results and keep a list of the medicines you take. How can you care for yourself at home? What to avoid   · Do not have drinks with caffeine, such as coffee or black tea, for 8 hours before bed. · Do not smoke or use other types of tobacco near bedtime. Nicotine is a stimulant and can keep you awake. · Avoid drinking alcohol late in the evening, because it can cause you to wake in the middle of the night. · Do not eat a big meal close to bedtime. If you are hungry, eat a light snack.   · Do not drink a lot of water close to bedtime, because the need to urinate may wake you up during the night. · Do not read or watch TV in bed. Use the bed only for sleeping and sexual activity. What to try   · Go to bed at the same time every night, and wake up at the same time every morning. Do not take naps during the day. · Keep your bedroom quiet, dark, and cool. · Sleep on a comfortable pillow and mattress. · If watching the clock makes you anxious, turn it facing away from you so you cannot see the time. · If you worry when you lie down, start a worry book. Well before bedtime, write down your worries, and then set the book and your concerns aside. · Try meditation or other relaxation techniques before you go to bed. · If you cannot fall asleep, get up and go to another room until you feel sleepy. Do something relaxing. Repeat your bedtime routine before you go to bed again. · Make your house quiet and calm about an hour before bedtime. Turn down the lights, turn off the TV, log off the computer, and turn down the volume on music. This can help you relax after a busy day. When should you call for help? Watch closely for changes in your health, and be sure to contact your doctor if:    · Your efforts to improve your sleep do not work.     · Your insomnia gets worse.     · You have been feeling down, depressed, or hopeless or have lost interest in things that you usually enjoy. Where can you learn more? Go to https://Game TrustpecoraewGrows Up.healthInterfolio. org and sign in to your Smart Panel account. Enter P513 in the Loffles box to learn more about \"Insomnia: Care Instructions. \"     If you do not have an account, please click on the \"Sign Up Now\" link. Current as of: August 31, 2020               Content Version: 12.9  © 8183-6597 Healthwise, Treater. Care instructions adapted under license by [facility] ([facility]).  If you have questions about a medical condition or this instruction, always ask your healthcare professional. Keila Yu disclaims any warranty or liability for your use of this information.

## 2021-07-02 LAB
ESTIMATED AVERAGE GLUCOSE: 137 MG/DL
HBA1C MFR BLD: 6.4 %

## 2021-07-06 DIAGNOSIS — E78.2 MIXED HYPERLIPIDEMIA: Primary | ICD-10-CM

## 2021-07-06 DIAGNOSIS — E78.2 MIXED HYPERLIPIDEMIA: ICD-10-CM

## 2021-07-06 RX ORDER — ROSUVASTATIN CALCIUM 10 MG/1
10 TABLET, COATED ORAL NIGHTLY
Qty: 30 TABLET | Refills: 3 | Status: SHIPPED | OUTPATIENT
Start: 2021-07-06 | End: 2021-07-07

## 2021-07-06 NOTE — TELEPHONE ENCOUNTER
Medication:   Requested Prescriptions     Pending Prescriptions Disp Refills    rosuvastatin (CRESTOR) 10 MG tablet [Pharmacy Med Name: ROSUVASTATIN 10MG TABLETS] 90 tablet      Sig: TAKE 1 TABLET BY MOUTH EVERY NIGHT       Last Filled:      Patient Phone Number: [phone] (home) [phone] (work)    Last appt: 7/1/2021   Next appt: Visit date not found    Last Lipid:   Lab Results   Component Value Date    CHOL 265 07/01/2021    TRIG 79 07/01/2021    HDL 68 07/01/2021    HDL 61 01/17/2012    [address] 181 07/01/2021       Last OARRS: No flowsheet data found.     Preferred Pharmacy:   [address], [facility] [phone]  [address] [place] [id number]  Phone: [phone] Fax: [phone]

## 2021-07-07 RX ORDER — ROSUVASTATIN CALCIUM 10 MG/1
TABLET, COATED ORAL
Qty: 90 TABLET | Refills: 1 | Status: SHIPPED | OUTPATIENT
Start: 2021-07-07 | End: 2022-09-26

## 2021-07-09 PROBLEM — F32.A DEPRESSION: Status: ACTIVE | Noted: 2021-07-09

## 2021-07-09 PROBLEM — N95.1 MENOPAUSAL SYMPTOMS: Status: ACTIVE | Noted: 2021-07-09

## 2021-07-09 ASSESSMENT — ENCOUNTER SYMPTOMS
ABDOMINAL PAIN: 0
VOMITING: 0
SORE THROAT: 0
SINUS PRESSURE: 0
CONSTIPATION: 0
SHORTNESS OF BREATH: 1
RHINORRHEA: 0
DIARRHEA: 0
NAUSEA: 0
BACK PAIN: 1
BLOOD IN STOOL: 0
SINUS PAIN: 0
WHEEZING: 0
COUGH: 0
CHEST TIGHTNESS: 0

## 2022-04-01 ENCOUNTER — TELEPHONE (OUTPATIENT)
Dept: CARDIOLOGY CLINIC | Age: 62
End: 2022-04-01

## 2022-04-01 NOTE — TELEPHONE ENCOUNTER
Spoke to MR RN and she advised patient to go to the ED    Spoke to the patient and she verbalized understanding.

## 2022-04-01 NOTE — TELEPHONE ENCOUNTER
Pt was seen 7 years ago in 2015 by [facility] and had an echo with no irregularities. Called stating she is having chest pain and tingling fingers and would like to come in.  Please find a time in [facility] schedule for np/cce

## 2022-07-09 ENCOUNTER — HOSPITAL ENCOUNTER (OUTPATIENT)
Dept: WOMENS IMAGING | Age: 62
Discharge: HOME | End: 2022-07-09
Payer: COMMERCIAL

## 2022-07-09 VITALS — BODY MASS INDEX: 45.69 KG/M2 | WEIGHT: 242 LBS | HEIGHT: 61 IN

## 2022-07-09 DIAGNOSIS — Z12.31 ENCOUNTER FOR SCREENING MAMMOGRAM FOR BREAST CANCER: ICD-10-CM

## 2022-07-09 PROCEDURE — 77067 SCR MAMMO BI INCL CAD: CPT

## 2022-07-11 ENCOUNTER — OFFICE VISIT (OUTPATIENT)
Dept: PRIMARY CARE CLINIC | Age: 62
End: 2022-07-11
Payer: COMMERCIAL

## 2022-07-11 ENCOUNTER — NURSE TRIAGE (OUTPATIENT)
Dept: OTHER | Facility: CLINIC | Age: 62
End: 2022-07-11

## 2022-07-11 VITALS
HEART RATE: 88 BPM | TEMPERATURE: 98.1 F | DIASTOLIC BLOOD PRESSURE: 90 MMHG | HEIGHT: 61 IN | BODY MASS INDEX: 45.59 KG/M2 | WEIGHT: 241.5 LBS | OXYGEN SATURATION: 99 % | SYSTOLIC BLOOD PRESSURE: 164 MMHG

## 2022-07-11 DIAGNOSIS — E55.9 VITAMIN D DEFICIENCY: ICD-10-CM

## 2022-07-11 DIAGNOSIS — I10 PRIMARY HYPERTENSION: Primary | ICD-10-CM

## 2022-07-11 DIAGNOSIS — Z00.00 ROUTINE PHYSICAL EXAMINATION: ICD-10-CM

## 2022-07-11 DIAGNOSIS — R06.02 SHORTNESS OF BREATH: ICD-10-CM

## 2022-07-11 DIAGNOSIS — E78.2 MIXED HYPERLIPIDEMIA: ICD-10-CM

## 2022-07-11 DIAGNOSIS — Z13.1 DIABETES MELLITUS SCREENING: ICD-10-CM

## 2022-07-11 PROCEDURE — 99214 OFFICE O/P EST MOD 30 MIN: CPT | Performed by: FAMILY MEDICINE

## 2022-07-11 PROCEDURE — 93000 ELECTROCARDIOGRAM COMPLETE: CPT | Performed by: FAMILY MEDICINE

## 2022-07-11 RX ORDER — ALBUTEROL SULFATE 90 UG/1
2 AEROSOL, METERED RESPIRATORY (INHALATION) EVERY 6 HOURS PRN
Qty: 18 G | Refills: 1 | Status: SHIPPED | OUTPATIENT
Start: 2022-07-11

## 2022-07-11 RX ORDER — LOSARTAN POTASSIUM 25 MG/1
25 TABLET ORAL DAILY
Qty: 30 TABLET | Refills: 2 | Status: SHIPPED | OUTPATIENT
Start: 2022-07-11 | End: 2022-08-25

## 2022-07-11 SDOH — ECONOMIC STABILITY: FOOD INSECURITY: WITHIN THE PAST 12 MONTHS, THE FOOD YOU BOUGHT JUST DIDN'T LAST AND YOU DIDN'T HAVE MONEY TO GET MORE.: NEVER TRUE

## 2022-07-11 SDOH — ECONOMIC STABILITY: FOOD INSECURITY: WITHIN THE PAST 12 MONTHS, YOU WORRIED THAT YOUR FOOD WOULD RUN OUT BEFORE YOU GOT MONEY TO BUY MORE.: NEVER TRUE

## 2022-07-11 ASSESSMENT — PATIENT HEALTH QUESTIONNAIRE - PHQ9
SUM OF ALL RESPONSES TO PHQ QUESTIONS 1-9: 1
10. IF YOU CHECKED OFF ANY PROBLEMS, HOW DIFFICULT HAVE THESE PROBLEMS MADE IT FOR YOU TO DO YOUR WORK, TAKE CARE OF THINGS AT HOME, OR GET ALONG WITH OTHER PEOPLE: 1
SUM OF ALL RESPONSES TO PHQ QUESTIONS 1-9: 5
9. THOUGHTS THAT YOU WOULD BE BETTER OFF DEAD, OR OF HURTING YOURSELF: 0
8. MOVING OR SPEAKING SO SLOWLY THAT OTHER PEOPLE COULD HAVE NOTICED. OR THE OPPOSITE, BEING SO FIGETY OR RESTLESS THAT YOU HAVE BEEN MOVING AROUND A LOT MORE THAN USUAL: 0
2. FEELING DOWN, DEPRESSED OR HOPELESS: 0
SUM OF ALL RESPONSES TO PHQ QUESTIONS 1-9: 5
7. TROUBLE CONCENTRATING ON THINGS, SUCH AS READING THE NEWSPAPER OR WATCHING TELEVISION: 0
SUM OF ALL RESPONSES TO PHQ QUESTIONS 1-9: 1
6. FEELING BAD ABOUT YOURSELF - OR THAT YOU ARE A FAILURE OR HAVE LET YOURSELF OR YOUR FAMILY DOWN: 0
5. POOR APPETITE OR OVEREATING: 1
1. LITTLE INTEREST OR PLEASURE IN DOING THINGS: 0
SUM OF ALL RESPONSES TO PHQ QUESTIONS 1-9: 5
1. LITTLE INTEREST OR PLEASURE IN DOING THINGS: 0
2. FEELING DOWN, DEPRESSED OR HOPELESS: 1
SUM OF ALL RESPONSES TO PHQ9 QUESTIONS 1 & 2: 0
SUM OF ALL RESPONSES TO PHQ QUESTIONS 1-9: 5
4. FEELING TIRED OR HAVING LITTLE ENERGY: 2
3. TROUBLE FALLING OR STAYING ASLEEP: 2
SUM OF ALL RESPONSES TO PHQ9 QUESTIONS 1 & 2: 1

## 2022-07-11 ASSESSMENT — ENCOUNTER SYMPTOMS
APNEA: 1
ALLERGIC/IMMUNOLOGIC NEGATIVE: 1
ABDOMINAL PAIN: 0
SHORTNESS OF BREATH: 1
EYES NEGATIVE: 1

## 2022-07-11 ASSESSMENT — SOCIAL DETERMINANTS OF HEALTH (SDOH): HOW HARD IS IT FOR YOU TO PAY FOR THE VERY BASICS LIKE FOOD, HOUSING, MEDICAL CARE, AND HEATING?: NOT HARD AT ALL

## 2022-07-11 NOTE — PROGRESS NOTES
SUBJECTIVE:  Patient ID: Nohemi Lima is a 60 y.o. female.   Chief Complaint:  Chief Complaint   Patient presents with    Hypertension    Shortness of Breath     when doing activity         HPI   60year old Female  Headache off/on  Dyspnea x last 2 month Off/on  No Hx Hypertension    Past Medical History:   Diagnosis Date    Allergic rhinitis     Arthrosis of hip     Carpal tunnel syndrome     Colon polyps 14    colonoscopy done    Eczema     GERD (gastroesophageal reflux disease)     Hyperlipidemia     Knee pain     Obstructive sleep apnea (adult) (pediatric)     Osteopenia     Retrocalcaneal bursitis (back of heel) 2017    Unspecified sleep apnea      Past Surgical History:   Procedure Laterality Date    COLONOSCOPY  14    colon polyps and diverticulosis    COLONOSCOPY  2017    To Zurita MD    EYE SURGERY Bilateral 2004    lasix    HEMORRHOID SURGERY      2017    HYSTERECTOMY (CERVIX STATUS UNKNOWN)  2002    complete    OVARY REMOVAL      bilateral - age 42     Allergies   Allergen Reactions    Bee Venom Swelling    Fish-Derived Products Swelling    Other Swelling     TREE NUTS    Shellfish Allergy Swelling    Tylenol [Acetaminophen]      Tylenol 3 makes pt itch     Family History   Problem Relation Age of Onset    Cancer Mother 77         lung cancer    Other Mother         Schizophrenia    Other Father         Don't know    Cancer Sister          age 61 Uterus &Breast    Asthma Grandchild     Hypertension Daughter     Diabetes Neg Hx     Heart Disease Neg Hx      Social History     Social History Narrative    FT job [employer]     from     3 children 39 ,42,38    No Tobacco    No alcohol       Patient Active Problem List   Diagnosis    Generalized anxiety disorder    Low back pain    Bleeding hemorrhoids    Right hip pain    Sleep apnea    Hyperlipidemia    Dermatitis    Allergic rhinitis    Obstructive sleep apnea    Obesity (BMI 30-39. 9)    Retrocalcaneal bursitis (back of heel)    Achilles tendinitis, right leg    AC joint arthropathy    Prediabetes    Primary insomnia    Depression     Current Outpatient Medications   Medication Sig Dispense Refill    rosuvastatin (CRESTOR) 10 MG tablet TAKE 1 TABLET BY MOUTH EVERY NIGHT 90 tablet 1    VITAMIN D, CHOLECALCIFEROL, PO ergocalciferol (vitamin D2) 1,250 mcg (50,000 unit) capsule      pimecrolimus (ELIDEL) 1 % cream       Turmeric 500 MG CAPS Take by mouth daily      b complex vitamins capsule Take 1 capsule by mouth daily      vitamin B-12 (CYANOCOBALAMIN) 1000 MCG tablet Take 1,000 mcg by mouth daily      ELDERBERRY PO Take by mouth      loratadine (CLARITIN) 10 MG tablet Take 1 tablet by mouth daily 30 tablet 0    EPINEPHrine (EPIPEN 2-MELANIE) 0.3 MG/0.3ML SOAJ injection Inject 0.3 mLs into the skin once for 1 dose Use as directed for allergic reaction 1 each 3     No current facility-administered medications for this visit.      Lab Results   Component Value Date    WBC 6.6 07/01/2021    HGB 12.6 07/01/2021    HCT 39.3 07/01/2021    MCV 83.4 07/01/2021     07/01/2021     Lab Results   Component Value Date    CHOL 265 (H) 07/01/2021    CHOL 279 (H) 06/11/2019    CHOL 242 (H) 12/24/2018     Lab Results   Component Value Date    TRIG 79 07/01/2021    TRIG 63 06/11/2019    TRIG 75 12/24/2018     Lab Results   Component Value Date    HDL 68 (H) 07/01/2021    HDL 74 (H) 06/11/2019    HDL 62 (H) 12/24/2018     Lab Results   Component Value Date    LDLCALC 181 (H) 07/01/2021    LDLCALC 192 (H) 06/11/2019    LDLCALC 165 (H) 12/24/2018     Lab Results   Component Value Date    LABVLDL 16 07/01/2021    LABVLDL 13 06/11/2019    LABVLDL 15 12/24/2018     No results found for: [place]    Chemistry        Component Value Date/Time     07/01/2021 1139    K 4.3 07/01/2021 1139     07/01/2021 1139    CO2 28 07/01/2021 1139    BUN 10 07/01/2021 1139    CREATININE 0.8 07/01/2021 1139        Component Value Date/Time    CALCIUM 9.6 07/01/2021 1139    ALKPHOS 109 07/01/2021 1139    AST 21 07/01/2021 1139    ALT 22 07/01/2021 1139    BILITOT 0.4 07/01/2021 1139        Lab Results   Component Value Date    TSH 1.22 07/01/2021     Lab Results   Component Value Date    LABA1C 6.4 07/01/2021     Lab Results   Component Value Date    .0 07/01/2021         Review of Systems   Constitutional:        Weigh disorder   HENT: Negative. Eyes: Negative. Respiratory: Positive for apnea and shortness of breath. She couldn't tolerate CPAP due Anxiety    Cardiovascular: Negative for chest pain, palpitations and leg swelling. Gastrointestinal: Negative for abdominal pain. Endocrine: Negative. Genitourinary:        GYN   Hysterectomy 2002  Mammogram 7/9/2022   Musculoskeletal:        Left Hip   Ortho appointment today   Allergic/Immunologic: Negative. Neurological: Negative for dizziness and headaches. Hematological: Negative. Psychiatric/Behavioral: The patient is nervous/anxious. Anxiety   Remote work    from  x 3 year        OBJECTIVE:  BP (!) 164/90 (Site: Right Upper Arm, Position: Sitting, Cuff Size: Large Adult)   Pulse 88   Temp 98.1 °F (36.7 °C) (Infrared)   Ht 5' 1\" (1.549 m)   Wt 241 lb 8 oz (109.5 kg)   SpO2 99%   BMI 45.63 kg/m²   Physical Exam  Constitutional:       Comments: BMI 45   HENT:      Head: Normocephalic. Eyes:      Extraocular Movements: Extraocular movements intact. Pupils: Pupils are equal, round, and reactive to light. Cardiovascular:      Rate and Rhythm: Normal rate and regular rhythm. Pulses: Normal pulses. Heart sounds: Normal heart sounds. Comments: EKG WNL  Pulmonary:      Effort: Pulmonary effort is normal.      Breath sounds: Normal breath sounds. No wheezing or rhonchi. Musculoskeletal:      Cervical back: Normal range of motion and neck supple.       Right lower leg: No edema. Left lower leg: No edema. Neurological:      General: No focal deficit present. Mental Status: She is alert and oriented to person, place, and time. Psychiatric:         Behavior: Behavior normal.         Thought Content: Thought content normal.         Judgment: Judgment normal.         ASSESSMENT/PLAN:      Diagnosis Orders   1. Primary hypertension  CBC with Auto Differential    Comprehensive Metabolic Panel   2. Shortness of breath  EKG 12 Lead    albuterol sulfate HFA (PROVENTIL HFA) 108 (90 Base) MCG/ACT inhaler   3. Routine physical examination  CBC with Auto Differential    Comprehensive Metabolic Panel    Hemoglobin A1C    Lipid Panel    TSH    Vitamin D 25 Hydroxy   4. BMI 45.0-49.9, adult (HCC)  TSH   5. Diabetes mellitus screening  Hemoglobin A1C   6. Mixed hyperlipidemia  CBC with Auto Differential    Comprehensive Metabolic Panel    Lipid Panel   7.  Vitamin D deficiency  Vitamin D 25 Hydroxy     Short of Breath EKG WNL  High BP reading low salt & start Rx & get close f/u with Dr Emmanuel Closs one month Pt will schedule  Lab update & FU for Routine Physical visit Dr Emmanuel Closs  Hyperlipidemia continue current Rx & get Lab update

## 2022-07-11 NOTE — TELEPHONE ENCOUNTER
Medication:   Requested Prescriptions     Pending Prescriptions Disp Refills    losartan (COZAAR) 25 MG tablet [Pharmacy Med Name: LOSARTAN 25MG TABLETS] 90 tablet      Sig: TAKE 1 TABLET BY MOUTH DAILY    albuterol sulfate HFA (PROVENTIL;VENTOLIN;PROAIR) 108 (90 Base) MCG/ACT inhaler [Pharmacy Med Name: ALBUTEROL HFA INH(200 PUFFS)18GM] 54 g      Sig: INHALE 2 PUFFS INTO THE LUNGS Q6H AS NEEDED FOR WHEEZING       Last Filled:      Patient Phone Number: [phone] (home) [phone] (work)    Last appt: 7/11/2022   Next appt: Visit date not found    Last BMP:   Lab Results   Component Value Date/Time     07/01/2021 11:39 AM    K 4.3 07/01/2021 11:39 AM     07/01/2021 11:39 AM    CO2 28 07/01/2021 11:39 AM    ANIONGAP 8 07/01/2021 11:39 AM    GLUCOSE 85 07/01/2021 11:39 AM    BUN 10 07/01/2021 11:39 AM    CREATININE 0.8 07/01/2021 11:39 AM    LABGLOM >60 07/01/2021 11:39 AM    GFRAA >60 07/01/2021 11:39 AM    GFRAA >60 01/18/2013 11:31 AM    CALCIUM 9.6 07/01/2021 11:39 AM      Last CMP:   Lab Results   Component Value Date/Time     07/01/2021 11:39 AM    K 4.3 07/01/2021 11:39 AM     07/01/2021 11:39 AM    CO2 28 07/01/2021 11:39 AM    ANIONGAP 8 07/01/2021 11:39 AM    GLUCOSE 85 07/01/2021 11:39 AM    BUN 10 07/01/2021 11:39 AM    CREATININE 0.8 07/01/2021 11:39 AM    LABGLOM >60 07/01/2021 11:39 AM    GFRAA >60 07/01/2021 11:39 AM    GFRAA >60 01/18/2013 11:31 AM    PROT 7.0 07/01/2021 11:39 AM    PROT 7.1 01/18/2013 11:31 AM    LABALBU 4.2 07/01/2021 11:39 AM    AGRATIO 1.5 07/01/2021 11:39 AM    BILITOT 0.4 07/01/2021 11:39 AM    ALKPHOS 109 07/01/2021 11:39 AM    ALT 22 07/01/2021 11:39 AM    AST 21 07/01/2021 11:39 AM    GLOB 2.8 07/01/2021 11:39 AM     Last Renal Function:   Lab Results   Component Value Date/Time     07/01/2021 11:39 AM    K 4.3 07/01/2021 11:39 AM     07/01/2021 11:39 AM    CO2 28 07/01/2021 11:39 AM    GLUCOSE 85 07/01/2021 11:39 AM    BUN 10 07/01/2021 11:39 AM    CREATININE 0.8 07/01/2021 11:39 AM    LABALBU 4.2 07/01/2021 11:39 AM    CALCIUM 9.6 07/01/2021 11:39 AM    GFR >60 01/18/2013 11:31 AM    GFRAA >60 07/01/2021 11:39 AM    GFRAA >60 01/18/2013 11:31 AM       Last OARRS: No flowsheet data found.     Preferred Pharmacy:   [address], [address] [place] [address] [phone]  [address] [address]  Phone: [phone] Fax: [phone]

## 2022-07-16 RX ORDER — LOSARTAN POTASSIUM 25 MG/1
25 TABLET ORAL DAILY
Qty: 90 TABLET | OUTPATIENT
Start: 2022-07-16

## 2022-07-16 RX ORDER — ALBUTEROL SULFATE 90 UG/1
AEROSOL, METERED RESPIRATORY (INHALATION)
Qty: 54 G | OUTPATIENT
Start: 2022-07-16

## 2022-08-25 DIAGNOSIS — I10 PRIMARY HYPERTENSION: Primary | ICD-10-CM

## 2022-08-25 RX ORDER — LOSARTAN POTASSIUM 25 MG/1
25 TABLET ORAL DAILY
Qty: 90 TABLET | Refills: 0 | Status: SHIPPED | OUTPATIENT
Start: 2022-08-25 | End: 2022-09-26 | Stop reason: ALTCHOICE

## 2022-08-25 NOTE — TELEPHONE ENCOUNTER
Medication:   Requested Prescriptions     Pending Prescriptions Disp Refills    losartan (COZAAR) 25 MG tablet [Pharmacy Med Name: LOSARTAN 25MG TABLETS] 90 tablet      Sig: TAKE 1 TABLET BY MOUTH DAILY       Last Filled:      Patient Phone Number: [phone] (home) [phone] (work)    Last appt: 7/11/2022   Next appt: 9/1/2022    Last BMP:   Lab Results   Component Value Date/Time     07/01/2021 11:39 AM    K 4.3 07/01/2021 11:39 AM     07/01/2021 11:39 AM    CO2 28 07/01/2021 11:39 AM    ANIONGAP 8 07/01/2021 11:39 AM    GLUCOSE 85 07/01/2021 11:39 AM    BUN 10 07/01/2021 11:39 AM    CREATININE 0.8 07/01/2021 11:39 AM    LABGLOM >60 07/01/2021 11:39 AM    GFRAA >60 07/01/2021 11:39 AM    GFRAA >60 01/18/2013 11:31 AM    CALCIUM 9.6 07/01/2021 11:39 AM      Last CMP:   Lab Results   Component Value Date/Time     07/01/2021 11:39 AM    K 4.3 07/01/2021 11:39 AM     07/01/2021 11:39 AM    CO2 28 07/01/2021 11:39 AM    ANIONGAP 8 07/01/2021 11:39 AM    GLUCOSE 85 07/01/2021 11:39 AM    BUN 10 07/01/2021 11:39 AM    CREATININE 0.8 07/01/2021 11:39 AM    LABGLOM >60 07/01/2021 11:39 AM    GFRAA >60 07/01/2021 11:39 AM    GFRAA >60 01/18/2013 11:31 AM    PROT 7.0 07/01/2021 11:39 AM    PROT 7.1 01/18/2013 11:31 AM    LABALBU 4.2 07/01/2021 11:39 AM    AGRATIO 1.5 07/01/2021 11:39 AM    BILITOT 0.4 07/01/2021 11:39 AM    ALKPHOS 109 07/01/2021 11:39 AM    ALT 22 07/01/2021 11:39 AM    AST 21 07/01/2021 11:39 AM    GLOB 2.8 07/01/2021 11:39 AM     Last Renal Function:   Lab Results   Component Value Date/Time     07/01/2021 11:39 AM    K 4.3 07/01/2021 11:39 AM     07/01/2021 11:39 AM    CO2 28 07/01/2021 11:39 AM    GLUCOSE 85 07/01/2021 11:39 AM    BUN 10 07/01/2021 11:39 AM    CREATININE 0.8 07/01/2021 11:39 AM    LABALBU 4.2 07/01/2021 11:39 AM    CALCIUM 9.6 07/01/2021 11:39 AM    GFR >60 01/18/2013 11:31 AM    GFRAA >60 07/01/2021 11:39 AM    GFRAA >60 01/18/2013 11:31 AM Last OARRS: No flowsheet data found.     Preferred Pharmacy:   [facility] [address] [facility],[address], [address] [address]  Phone: [phone] Fax: [phone]  Medication:   Requested Prescriptions     Pending Prescriptions Disp Refills    losartan (COZAAR) 25 MG tablet [Pharmacy Med Name: LOSARTAN 25MG TABLETS] 90 tablet      Sig: TAKE 1 TABLET BY MOUTH DAILY       Last Filled:      Patient Phone Number: [phone] (home) [phone] (work)    Last appt: 7/11/2022   Next appt: 9/1/2022    Last BMP:   Lab Results   Component Value Date/Time     07/01/2021 11:39 AM    K 4.3 07/01/2021 11:39 AM     07/01/2021 11:39 AM    CO2 28 07/01/2021 11:39 AM    ANIONGAP 8 07/01/2021 11:39 AM    GLUCOSE 85 07/01/2021 11:39 AM    BUN 10 07/01/2021 11:39 AM    CREATININE 0.8 07/01/2021 11:39 AM    LABGLOM >60 07/01/2021 11:39 AM    GFRAA >60 07/01/2021 11:39 AM    GFRAA >60 01/18/2013 11:31 AM    CALCIUM 9.6 07/01/2021 11:39 AM      Last CMP:   Lab Results   Component Value Date/Time     07/01/2021 11:39 AM    K 4.3 07/01/2021 11:39 AM     07/01/2021 11:39 AM    CO2 28 07/01/2021 11:39 AM    ANIONGAP 8 07/01/2021 11:39 AM    GLUCOSE 85 07/01/2021 11:39 AM    BUN 10 07/01/2021 11:39 AM    CREATININE 0.8 07/01/2021 11:39 AM    LABGLOM >60 07/01/2021 11:39 AM    GFRAA >60 07/01/2021 11:39 AM    GFRAA >60 01/18/2013 11:31 AM    PROT 7.0 07/01/2021 11:39 AM    PROT 7.1 01/18/2013 11:31 AM    LABALBU 4.2 07/01/2021 11:39 AM    AGRATIO 1.5 07/01/2021 11:39 AM    BILITOT 0.4 07/01/2021 11:39 AM    ALKPHOS 109 07/01/2021 11:39 AM    ALT 22 07/01/2021 11:39 AM    AST 21 07/01/2021 11:39 AM    GLOB 2.8 07/01/2021 11:39 AM     Last Renal Function:   Lab Results   Component Value Date/Time     07/01/2021 11:39 AM    K 4.3 07/01/2021 11:39 AM     07/01/2021 11:39 AM    CO2 28 07/01/2021 11:39 AM    GLUCOSE 85 07/01/2021 11:39 AM    BUN 10 07/01/2021 11:39 AM    CREATININE 0.8 07/01/2021 11:39 AM    LABALBU 4.2 07/01/2021 11:39 AM    CALCIUM 9.6 07/01/2021 11:39 AM    GFR >60 01/18/2013 11:31 AM    GFRAA >60 07/01/2021 11:39 AM    GFRAA >60 01/18/2013 11:31 AM       Last OARRS: No flowsheet data found.     Preferred Pharmacy:   [facility]  [address] [facility],[address], [address] [place] [address] [phone]  [address] [place] [address]  Phone: [phone] Fax: [phone]

## 2022-09-24 DIAGNOSIS — E78.2 MIXED HYPERLIPIDEMIA: ICD-10-CM

## 2022-09-26 ENCOUNTER — OFFICE VISIT (OUTPATIENT)
Dept: PRIMARY CARE CLINIC | Age: 62
End: 2022-09-26
Payer: COMMERCIAL

## 2022-09-26 ENCOUNTER — HOSPITAL ENCOUNTER (OUTPATIENT)
Dept: GENERAL RADIOLOGY | Age: 62
Discharge: HOME | End: 2022-09-26
Payer: COMMERCIAL

## 2022-09-26 VITALS
HEART RATE: 76 BPM | WEIGHT: 238.8 LBS | DIASTOLIC BLOOD PRESSURE: 88 MMHG | SYSTOLIC BLOOD PRESSURE: 138 MMHG | BODY MASS INDEX: 45.12 KG/M2 | OXYGEN SATURATION: 98 %

## 2022-09-26 DIAGNOSIS — I10 PRIMARY HYPERTENSION: Primary | ICD-10-CM

## 2022-09-26 DIAGNOSIS — R05.9 COUGH: ICD-10-CM

## 2022-09-26 DIAGNOSIS — R06.02 SHORTNESS OF BREATH: ICD-10-CM

## 2022-09-26 DIAGNOSIS — R09.89 PHLEGM IN THROAT: ICD-10-CM

## 2022-09-26 DIAGNOSIS — I10 PRIMARY HYPERTENSION: ICD-10-CM

## 2022-09-26 DIAGNOSIS — R20.0 NUMBNESS AND TINGLING OF LEFT LEG: ICD-10-CM

## 2022-09-26 DIAGNOSIS — R42 DIZZINESS: ICD-10-CM

## 2022-09-26 DIAGNOSIS — R07.9 CHEST PAIN, UNSPECIFIED TYPE: ICD-10-CM

## 2022-09-26 DIAGNOSIS — R05.9 COUGH, UNSPECIFIED TYPE: ICD-10-CM

## 2022-09-26 DIAGNOSIS — E78.2 MIXED HYPERLIPIDEMIA: ICD-10-CM

## 2022-09-26 DIAGNOSIS — R20.2 NUMBNESS AND TINGLING OF LEFT LEG: ICD-10-CM

## 2022-09-26 PROCEDURE — 71046 X-RAY EXAM CHEST 2 VIEWS: CPT

## 2022-09-26 PROCEDURE — 99214 OFFICE O/P EST MOD 30 MIN: CPT | Performed by: INTERNAL MEDICINE

## 2022-09-26 RX ORDER — ROSUVASTATIN CALCIUM 10 MG/1
TABLET, COATED ORAL
Qty: 30 TABLET | Refills: 0 | Status: SHIPPED | OUTPATIENT
Start: 2022-09-26 | End: 2022-10-03

## 2022-09-26 RX ORDER — LOSARTAN POTASSIUM AND HYDROCHLOROTHIAZIDE 12.5; 5 MG/1; MG/1
1 TABLET ORAL DAILY
Qty: 30 TABLET | Refills: 1 | Status: SHIPPED | OUTPATIENT
Start: 2022-09-26

## 2022-09-26 NOTE — TELEPHONE ENCOUNTER
Medication:   Requested Prescriptions     Pending Prescriptions Disp Refills    rosuvastatin (CRESTOR) 10 MG tablet [Pharmacy Med Name: ROSUVASTATIN 10MG TABLETS] 90 tablet 1     Sig: TAKE 1 TABLET BY MOUTH EVERY NIGHT       Last Filled:      Patient Phone Number: [phone] (home) [phone] (work)    Last appt: 7/11/2022   Next appt: 9/26/2022    Last Lipid:   Lab Results   Component Value Date/Time    CHOL 265 07/01/2021 11:39 AM    TRIG 79 07/01/2021 11:39 AM    HDL 68 07/01/2021 11:39 AM    HDL 61 01/17/2012 08:15 AM    LDLCALC 181 07/01/2021 11:39 AM       Last OARRS: No flowsheet data found. Preferred Pharmacy:   [facility] [address] [place] [address],[address], [address] [place] [address] [phone] [facility] [phone]  [address] [place] [id number]  Phone: [phone] Fax: [phone]      Medication:   Requested Prescriptions     Pending Prescriptions Disp Refills    rosuvastatin (CRESTOR) 10 MG tablet [Pharmacy Med Name: ROSUVASTATIN 10MG TABLETS] 90 tablet 1     Sig: TAKE 1 TABLET BY MOUTH EVERY NIGHT       Last Filled:      Patient Phone Number: [phone] (home) [phone] (work)    Last appt: 7/11/2022   Next appt: 9/26/2022    Last Lipid:   Lab Results   Component Value Date/Time    CHOL 265 07/01/2021 11:39 AM    TRIG 79 07/01/2021 11:39 AM    HDL 68 07/01/2021 11:39 AM    HDL 61 01/17/2012 08:15 AM    LDLCALC 181 07/01/2021 11:39 AM       Last OARRS: No flowsheet data found.     Preferred Pharmacy:   [facility] [facility],[address], [address] [place] [address] [phone]  [address] [place] [address]  Phone: [phone] Fax: [phone]

## 2022-09-27 RX ORDER — LOSARTAN POTASSIUM AND HYDROCHLOROTHIAZIDE 12.5; 5 MG/1; MG/1
1 TABLET ORAL DAILY
Qty: 90 TABLET | OUTPATIENT
Start: 2022-09-27

## 2022-09-27 NOTE — TELEPHONE ENCOUNTER
Medication:   Requested Prescriptions     Pending Prescriptions Disp Refills    rosuvastatin (CRESTOR) 10 MG tablet [Pharmacy Med Name: ROSUVASTATIN 10MG TABLETS] 90 tablet      Sig: TAKE 1 TABLET BY MOUTH EVERY NIGHT     Last Filled:  9.26.22    Last appt: 9/26/2022   Next appt: 10/18/2022    Last Lipid:   Lab Results   Component Value Date/Time    CHOL 265 07/01/2021 11:39 AM    TRIG 79 07/01/2021 11:39 AM    HDL 68 07/01/2021 11:39 AM    HDL 61 01/17/2012 08:15 AM    LDLCALC 181 07/01/2021 11:39 AM

## 2022-09-27 NOTE — TELEPHONE ENCOUNTER
Medication:   Requested Prescriptions     Pending Prescriptions Disp Refills    losartan-hydroCHLOROthiazide (HYZAAR) 50-12.5 MG per tablet [Pharmacy Med Name: LOSARTAN/HCTZ 50/12.5MG TABLETS] 90 tablet      Sig: TAKE 1 TABLET BY MOUTH DAILY     Last Filled:  09/26/22    Last appt: 9/26/2022   Next appt: 10/18/22    Last OARRS: No flowsheet data found.

## 2022-10-01 ENCOUNTER — HOSPITAL ENCOUNTER (OUTPATIENT)
Age: 62
Discharge: HOME | End: 2022-10-01
Payer: COMMERCIAL

## 2022-10-01 DIAGNOSIS — I10 PRIMARY HYPERTENSION: ICD-10-CM

## 2022-10-01 DIAGNOSIS — E78.2 MIXED HYPERLIPIDEMIA: ICD-10-CM

## 2022-10-01 DIAGNOSIS — Z13.1 DIABETES MELLITUS SCREENING: ICD-10-CM

## 2022-10-01 DIAGNOSIS — Z00.00 ROUTINE PHYSICAL EXAMINATION: ICD-10-CM

## 2022-10-01 DIAGNOSIS — E55.9 VITAMIN D DEFICIENCY: ICD-10-CM

## 2022-10-01 LAB
A/G RATIO: 1.5 (ref 1.1–2.2)
ALBUMIN SERPL-MCNC: 4.3 G/DL (ref 3.4–5)
ALP BLD-CCNC: 108 U/L (ref 40–129)
ALT SERPL-CCNC: 25 U/L (ref 10–40)
ANION GAP SERPL CALCULATED.3IONS-SCNC: 9 MMOL/L (ref 3–16)
AST SERPL-CCNC: 22 U/L (ref 15–37)
BASOPHILS ABSOLUTE: 0.1 K/UL (ref 0–0.2)
BASOPHILS RELATIVE PERCENT: 0.9 %
BILIRUB SERPL-MCNC: 0.3 MG/DL (ref 0–1)
BUN BLDV-MCNC: 10 MG/DL (ref 7–20)
CALCIUM SERPL-MCNC: 9.2 MG/DL (ref 8.3–10.6)
CHLORIDE BLD-SCNC: 100 MMOL/L (ref 99–110)
CHOLESTEROL, TOTAL: 213 MG/DL (ref 0–199)
CO2: 27 MMOL/L (ref 21–32)
CREAT SERPL-MCNC: 0.8 MG/DL (ref 0.6–1.2)
EOSINOPHILS ABSOLUTE: 0.1 K/UL (ref 0–0.6)
EOSINOPHILS RELATIVE PERCENT: 2 %
GFR AFRICAN AMERICAN: >60
GFR NON-AFRICAN AMERICAN: >60
GLUCOSE BLD-MCNC: 120 MG/DL (ref 70–99)
HCT VFR BLD CALC: 39.4 % (ref 36–48)
HDLC SERPL-MCNC: 66 MG/DL (ref 40–60)
HEMOGLOBIN: 12.6 G/DL (ref 12–16)
LDL CHOLESTEROL CALCULATED: 130 MG/DL
LYMPHOCYTES ABSOLUTE: 1.8 K/UL (ref 1–5.1)
LYMPHOCYTES RELATIVE PERCENT: 26 %
MCH RBC QN AUTO: 26.8 PG (ref 26–34)
MCHC RBC AUTO-ENTMCNC: 32 G/DL (ref 31–36)
MCV RBC AUTO: 83.6 FL (ref 80–100)
MONOCYTES ABSOLUTE: 0.8 K/UL (ref 0–1.3)
MONOCYTES RELATIVE PERCENT: 11.2 %
NEUTROPHILS ABSOLUTE: 4.2 K/UL (ref 1.7–7.7)
NEUTROPHILS RELATIVE PERCENT: 59.9 %
PDW BLD-RTO: 14.4 % (ref 12.4–15.4)
PLATELET # BLD: 346 K/UL (ref 135–450)
PMV BLD AUTO: 8.1 FL (ref 5–10.5)
POTASSIUM SERPL-SCNC: 4 MMOL/L (ref 3.5–5.1)
RBC # BLD: 4.71 M/UL (ref 4–5.2)
SODIUM BLD-SCNC: 136 MMOL/L (ref 136–145)
TOTAL PROTEIN: 7.2 G/DL (ref 6.4–8.2)
TRIGL SERPL-MCNC: 87 MG/DL (ref 0–150)
TSH SERPL DL<=0.05 MIU/L-ACNC: 0.88 UIU/ML (ref 0.27–4.2)
VITAMIN D 25-HYDROXY: 25 NG/ML
VLDLC SERPL CALC-MCNC: 17 MG/DL
WBC # BLD: 7.3 K/UL (ref 4–11)

## 2022-10-01 PROCEDURE — 80053 COMPREHEN METABOLIC PANEL: CPT

## 2022-10-01 PROCEDURE — 84443 ASSAY THYROID STIM HORMONE: CPT

## 2022-10-01 PROCEDURE — 36415 COLL VENOUS BLD VENIPUNCTURE: CPT

## 2022-10-01 PROCEDURE — 85025 COMPLETE CBC W/AUTO DIFF WBC: CPT

## 2022-10-01 PROCEDURE — 82306 VITAMIN D 25 HYDROXY: CPT

## 2022-10-01 PROCEDURE — 80061 LIPID PANEL: CPT

## 2022-10-01 PROCEDURE — 83036 HEMOGLOBIN GLYCOSYLATED A1C: CPT

## 2022-10-02 LAB
ESTIMATED AVERAGE GLUCOSE: 168.6 MG/DL
HBA1C MFR BLD: 7.5 %

## 2022-10-03 RX ORDER — ROSUVASTATIN CALCIUM 10 MG/1
TABLET, COATED ORAL
Qty: 90 TABLET | Refills: 0 | Status: SHIPPED | OUTPATIENT
Start: 2022-10-03 | End: 2022-10-07

## 2022-10-03 NOTE — RESULT ENCOUNTER NOTE
Called patient to notify her that the lab results were back. Dr. Samuel Alvarado recommended that patient make an appointment to discuss lab results with Dr. Anna Kulkarni. I spoke directly to the patient.

## 2022-10-04 PROBLEM — R07.89 ATYPICAL CHEST PAIN: Status: ACTIVE | Noted: 2022-10-04

## 2022-10-04 PROBLEM — I10 HYPERTENSION: Status: ACTIVE | Noted: 2022-10-04

## 2022-10-04 NOTE — PROGRESS NOTES
[address]  10/7/22  Referring: Dr. Jessie Arboleda CONSULT/CHIEF COMPLAINT/HPI     Reason for visit/ Chief complaint   New Patient    Atypical Chest Pain    HPI Brianne Varela is a 60 y.o. new patient here by referral from Dr David Rene for Atypical Chest Pain. Chest is tender to palpation, sharp stabbing pain at left breast, left finger numbness, happens at rest, does not feel with activity. Her mother  of Lung cancer. Sister  of breast and uterine cancer. Her daughter has hypertension. Pt has a history of HLD and LANE. Denies smoking history. Today she states that she has pain on Left side of chest that is intermittent and only happens when sitting. She has decreased sensation on Left upper and lower. She is prediabetic. She has gained some weight with COVID and working from home. She has appt with pulmonology on Friday due to SOB with walking any distance or steps. She has trouble catching breath in bed due to sleep apnea and cannot lay on R side. Chest is tender to palpation on L mid sternal area. Mammogram was normal.     Patient is adherent with medications and is tolerating them well without side effects     HISTORY/ALLERGIES/ROS     MedHx:  has a past medical history of Allergic rhinitis, Arthrosis of hip, Carpal tunnel syndrome, Colon polyps, Eczema, GERD (gastroesophageal reflux disease), Hyperlipidemia, Knee pain, Obstructive sleep apnea (adult) (pediatric), Osteopenia, Retrocalcaneal bursitis (back of heel), and Unspecified sleep apnea. SurgHx:  has a past surgical history that includes Eye surgery (Bilateral, 2004); Colonoscopy (14); Colonoscopy (2017); Hemorrhoid surgery; Hysterectomy (2002); and Ovary removal.   SocHx:  reports that she has never smoked. She has never used smokeless tobacco. She reports that she does not drink alcohol and does not use drugs.    FamHx: No family history of premature coronary artery disease, sudden death, or aneurysm  Allergies: Bee venom, Fish-derived products, Other, Shellfish allergy, and Tylenol [acetaminophen]     MEDICATIONS      Prior to Admission medications    Medication Sig Start Date End Date Taking? Authorizing Provider   rosuvastatin (CRESTOR) 10 MG tablet TAKE 1 TABLET BY MOUTH EVERY NIGHT 10/3/22  Yes Annelise Rabago MD   DANDELION ROOT PO Take by mouth   Yes Historical Provider, MD   MAGNESIUM PO Take by mouth   Yes Historical Provider, MD   losartan-hydroCHLOROthiazide (HYZAAR) 50-12.5 MG per tablet Take 1 tablet by mouth daily 9/26/22  Yes Annelise Rabago MD   beclomethasone (QVAR REDIHALER) 40 MCG/ACT AERB inhaler Inhale 1 puff into the lungs in the morning and 1 puff in the evening. 9/26/22  Yes Annelise Rabago MD   albuterol sulfate HFA (PROVENTIL HFA) 108 (90 Base) MCG/ACT inhaler Inhale 2 puffs into the lungs every 6 hours as needed for Wheezing 7/11/22  Yes Aishwarya Rocha MD   VITAMIN D, CHOLECALCIFEROL, PO ergocalciferol (vitamin D2) 1,250 mcg (50,000 unit) capsule   Yes Historical Provider, MD   pimecrolimus (ELIDEL) 1 % cream  3/25/21  Yes Historical Provider, MD   Turmeric 500 MG CAPS Take by mouth daily   Yes Historical Provider, MD   b complex vitamins capsule Take 1 capsule by mouth daily   Yes Historical Provider, MD   vitamin B-12 (CYANOCOBALAMIN) 1000 MCG tablet Take 1,000 mcg by mouth daily   Yes Historical Provider, MD   ELDERBERRY PO Take by mouth   Yes Historical Provider, MD   loratadine (CLARITIN) 10 MG tablet Take 1 tablet by mouth daily 6/11/19  Yes Annelise Rabago MD   EPINEPHrine (EPIPEN 2-MELANIE) 0.3 MG/0.3ML SOAJ injection Inject 0.3 mLs into the skin once for 1 dose Use as directed for allergic reaction 6/11/19 10/7/22 Yes Annelise Rabago MD       PHYSICAL EXAM        Vitals:    10/07/22 0830   BP: 130/82   Pulse: 93   SpO2: 100%    Weight: 239 lb 9.6 oz (108.7 kg)     Gen Alert, cooperative, no distress  Body mass index is 45.27 kg/m².    Heart  Regular rate and rhythm, no murmur   Head Normocephalic, atraumatic, no abnormalities Abd  Soft, NT, +BS, no mass, no OM   Eyes PERRLA, conj/corn clear Ext  Ext nl, AT, no C/C, no edema   Nose Nares normal, no drain age, Non-tender Pulse 2+ and symmetric   Throat Lips, mucosa, tongue normal Skin Color/text/turg nl, no rash/lesions   Neck S/S, TM, NT, no bruit Psych Nl mood and affect   Lung CTA-B, unlabored, no DTP     Ch wall NT, no deform       LABS and Imaging     Relevant and available CV data reviewed    EKG personally interpreted: 7/11/22  Sinus  Rhythm   WITHIN NORMAL LIMITS    Lipid 10/1/22  TG 87 HDL 66     BNP - None   Troponin- none     Echo 4/6/15   Summary   -Normal left ventricle size and systolic function with an estimated ejection   fraction of 60-65%. -No regional wall motion abnormalities are seen. -There is mild concentric left ventricular hypertrophy.   -There is reversal of E/A inflow velocities across the mitral valve   suggesting impaired left ventricular relaxation.   -There is mild pulmonic and trivial mitral and tricuspid regurgitation with   RVSP estimated at 30 mmHg. NM Stress:4/6/15    Summary    Normal LV size and systolic function. Left ventricular ejection fraction of 76 %. There is normal isotope uptake at stress and rest. There is no evidence of    myocardial ischemia or scar. CXR 9/26/22  FINDINGS:   Medical Devices: None. Heart and Mediastinum: Cardiomediastinal silhouette is within normal limits. Lungs and Pleura: Mild linear atelectasis or scarring in the left lung base. Lungs are otherwise clear without focal consolidations. No pleural effusions or evidence of pneumothorax. Bones and soft tissues: No acute abnormalities.       Cath: none   Holter: none       ModerateRisk  ModerateComplexity/Medical Decision Making    Outside/Care everywhere records Reviewed  Labs Reviewed  Prior Imaging, ekg, cath, echo reviewed when available  Medications reviewed  Old Notes reviewed  ASSESSMENT AND PLAN     1. Atypical Chest Pain   PCP did CXR ( see results above)   3-4 weeks, 1 min intermittent   Sharp in L breast area- momogram 7/9/22 normal   L finger numbness and leg   Only at rest  Reproducible, however she has DORADO that could be anginal equivalent and she has numerous risk factors for CAD  The 10-year ASCVD risk score (Celia DK, et al., 2019) is: 8.4%    Values used to calculate the score:      Age: 60 years      Sex: Female      Is Non- : Yes      Diabetic: No      Tobacco smoker: No      Systolic Blood Pressure: 587 mmHg      Is BP treated: Yes      HDL Cholesterol: 66 mg/dL      Total Cholesterol: 213 mg/dL    Plan:  NM stress test - cath if abnormal     2. Hypertension   BP today 130/82  Losartan/hctz 50-12.5mg daily   Monitors at home   Does not add salt   Exercise- Maria D and hip hop x 3d week   Plan   Continue above meds     3. SOB with exertion   PCP did CXR and referral to Pulmonary   Inhaler daily x 6 months   Dry cough x 6 months and triggered by SOB - PCP referral to Otolaryngology   Beclomethasone 40mcg/act aerb inhaler   Plan  Follow up with Pulmonology this week   Continue inhalers above   NM Stress test - Cath if abnormal     4. Hyperlipidemia   Lipid 10/1/22  TG 87 HDL 66   Crestor 10mg daily   Exercise- Maria D and hip hop x 3d week   Plan:  Increase Crestor 40mg daily, goal LDL < 70    5. LANE   Plan:  Continue CPAP     6. Obesity   Discussed weight loss and bariatric surgery to help with weight loss and benefits. Plan  Referral for Bariatric Surgery - Dr Denzel Ro     Patient counseled on lifestyle modification, diet, and exercise. Follow Up: 3 months     Ninoska Browning MD   Cardiologist [facility]    Scribe Attestation: This note was scribed in the presence of Dr. Mini Sims  by Maile Owens RN. Physician Attestation  The scribe wrote this note in the presence of jackelyn Browning MD).   The scribe may have prepopulated components of this note with my historical  intellectual property under my direct supervision. Any additions to this intellectual property were performed in my presence and at my direction. Furthermore, the content and accuracy of this note have been reviewed by me with edits by me as needed.   Mose Skiff, MD 10/7/2022 9:18 AM

## 2022-10-07 ENCOUNTER — OFFICE VISIT (OUTPATIENT)
Dept: CARDIOLOGY CLINIC | Age: 62
End: 2022-10-07
Payer: COMMERCIAL

## 2022-10-07 VITALS
HEIGHT: 61 IN | WEIGHT: 239.6 LBS | SYSTOLIC BLOOD PRESSURE: 130 MMHG | HEART RATE: 93 BPM | DIASTOLIC BLOOD PRESSURE: 82 MMHG | BODY MASS INDEX: 45.24 KG/M2 | OXYGEN SATURATION: 100 %

## 2022-10-07 DIAGNOSIS — I10 HYPERTENSION, UNSPECIFIED TYPE: ICD-10-CM

## 2022-10-07 DIAGNOSIS — G47.33 OSA (OBSTRUCTIVE SLEEP APNEA): ICD-10-CM

## 2022-10-07 DIAGNOSIS — E66.01 CLASS 3 SEVERE OBESITY WITH BODY MASS INDEX (BMI) OF 45.0 TO 49.9 IN ADULT, UNSPECIFIED OBESITY TYPE, UNSPECIFIED WHETHER SERIOUS COMORBIDITY PRESENT (HCC): ICD-10-CM

## 2022-10-07 DIAGNOSIS — R07.89 ATYPICAL CHEST PAIN: Primary | ICD-10-CM

## 2022-10-07 DIAGNOSIS — E78.2 MIXED HYPERLIPIDEMIA: ICD-10-CM

## 2022-10-07 DIAGNOSIS — R06.02 SOB (SHORTNESS OF BREATH): ICD-10-CM

## 2022-10-07 PROCEDURE — 99204 OFFICE O/P NEW MOD 45 MIN: CPT | Performed by: INTERNAL MEDICINE

## 2022-10-07 RX ORDER — ROSUVASTATIN CALCIUM 10 MG/1
40 TABLET, COATED ORAL DAILY
Qty: 90 TABLET | Refills: 3 | Status: SHIPPED | OUTPATIENT
Start: 2022-10-07

## 2022-10-07 NOTE — PATIENT INSTRUCTIONS
Follow up with Dr Carroll Dinero in 3 months   Increase Crestor to 40mg daily - can take CoQ10 with this for the muscle aches   Echo and stress test soon   Call for any questions or concerns.

## 2022-10-09 ASSESSMENT — ENCOUNTER SYMPTOMS
COUGH: 1
DIARRHEA: 0
CONSTIPATION: 0
WHEEZING: 0
NAUSEA: 0
SORE THROAT: 0
ABDOMINAL PAIN: 0
RHINORRHEA: 0
CHEST TIGHTNESS: 0
SINUS PRESSURE: 0
SHORTNESS OF BREATH: 1
BLOOD IN STOOL: 0
SINUS PAIN: 0
VOMITING: 0

## 2022-10-10 PROBLEM — R06.02 SHORTNESS OF BREATH: Status: ACTIVE | Noted: 2022-10-10

## 2022-10-10 PROBLEM — R05.9 COUGH: Status: ACTIVE | Noted: 2022-10-10

## 2022-10-10 PROBLEM — R20.2 NUMBNESS AND TINGLING OF LEFT LEG: Status: ACTIVE | Noted: 2022-10-10

## 2022-10-10 PROBLEM — R09.89 PHLEGM IN THROAT: Status: ACTIVE | Noted: 2022-10-10

## 2022-10-10 PROBLEM — R20.0 NUMBNESS AND TINGLING OF LEFT LEG: Status: ACTIVE | Noted: 2022-10-10

## 2022-10-10 PROBLEM — R07.9 CHEST PAIN: Status: ACTIVE | Noted: 2022-10-10

## 2022-10-10 PROBLEM — R42 DIZZINESS: Status: ACTIVE | Noted: 2022-10-10

## 2022-10-14 ENCOUNTER — TELEPHONE (OUTPATIENT)
Dept: FAMILY MEDICINE CLINIC | Age: 62
End: 2022-10-14

## 2022-10-14 NOTE — TELEPHONE ENCOUNTER
Appointment notes were updated, still left 30 minute slot just in case patient feels better before the 18th so Physical can still be done

## 2022-10-14 NOTE — TELEPHONE ENCOUNTER
----- Message from Domingo Valentino sent at 10/14/2022  3:45 PM EDT -----  Subject: Message to Provider    QUESTIONS  Information for Provider? Patient would like to use appt on 10/18/22 for a   sinus infection instead of her physical. please let her know if this   works. ---------------------------------------------------------------------------  --------------  Song BORJA  [phone]; OK to leave message on voicemail  ---------------------------------------------------------------------------  --------------  SCRIPT ANSWERS  Relationship to Patient?  Self

## 2022-10-18 ENCOUNTER — OFFICE VISIT (OUTPATIENT)
Dept: PRIMARY CARE CLINIC | Age: 62
End: 2022-10-18
Payer: COMMERCIAL

## 2022-10-18 ENCOUNTER — HOSPITAL ENCOUNTER (OUTPATIENT)
Dept: GENERAL RADIOLOGY | Age: 62
Discharge: HOME | End: 2022-10-18
Payer: COMMERCIAL

## 2022-10-18 VITALS
SYSTOLIC BLOOD PRESSURE: 112 MMHG | OXYGEN SATURATION: 99 % | HEART RATE: 61 BPM | WEIGHT: 237 LBS | RESPIRATION RATE: 20 BRPM | BODY MASS INDEX: 44.78 KG/M2 | TEMPERATURE: 97.1 F | DIASTOLIC BLOOD PRESSURE: 74 MMHG

## 2022-10-18 DIAGNOSIS — R05.9 COUGH, UNSPECIFIED TYPE: ICD-10-CM

## 2022-10-18 DIAGNOSIS — J06.9 UPPER RESPIRATORY TRACT INFECTION, UNSPECIFIED TYPE: ICD-10-CM

## 2022-10-18 DIAGNOSIS — L60.0 INGROWN TOENAIL: ICD-10-CM

## 2022-10-18 DIAGNOSIS — E55.9 VITAMIN D DEFICIENCY: ICD-10-CM

## 2022-10-18 DIAGNOSIS — Z91.018 ALLERGY TO TREE NUTS: ICD-10-CM

## 2022-10-18 DIAGNOSIS — R06.02 SHORTNESS OF BREATH: ICD-10-CM

## 2022-10-18 DIAGNOSIS — Z91.030 ALLERGIC TO BEES: ICD-10-CM

## 2022-10-18 DIAGNOSIS — J06.9 UPPER RESPIRATORY TRACT INFECTION, UNSPECIFIED TYPE: Primary | ICD-10-CM

## 2022-10-18 DIAGNOSIS — E11.9 TYPE 2 DIABETES MELLITUS WITHOUT COMPLICATION, WITHOUT LONG-TERM CURRENT USE OF INSULIN (HCC): ICD-10-CM

## 2022-10-18 DIAGNOSIS — Z91.013 ALLERGY TO SHELLFISH: ICD-10-CM

## 2022-10-18 LAB
CREATININE URINE: 360.9 MG/DL (ref 28–259)
MICROALBUMIN UR-MCNC: 3.4 MG/DL
MICROALBUMIN/CREAT UR-RTO: 9.4 MG/G (ref 0–30)

## 2022-10-18 PROCEDURE — 99214 OFFICE O/P EST MOD 30 MIN: CPT | Performed by: INTERNAL MEDICINE

## 2022-10-18 PROCEDURE — 3051F HG A1C>EQUAL 7.0%<8.0%: CPT | Performed by: INTERNAL MEDICINE

## 2022-10-18 PROCEDURE — 71046 X-RAY EXAM CHEST 2 VIEWS: CPT

## 2022-10-18 RX ORDER — ERGOCALCIFEROL 1.25 MG/1
50000 CAPSULE ORAL WEEKLY
Qty: 4 CAPSULE | Refills: 3 | Status: SHIPPED | OUTPATIENT
Start: 2022-10-18 | End: 2022-10-18

## 2022-10-18 RX ORDER — FLUTICASONE PROPIONATE 50 MCG
2 SPRAY, SUSPENSION (ML) NASAL DAILY
Qty: 16 G | Refills: 0 | Status: SHIPPED | OUTPATIENT
Start: 2022-10-18

## 2022-10-18 RX ORDER — FLUTICASONE PROPIONATE 50 MCG
SPRAY, SUSPENSION (ML) NASAL
Qty: 16 G | Refills: 0 | OUTPATIENT
Start: 2022-10-18

## 2022-10-18 RX ORDER — LANCETS 30 GAUGE
EACH MISCELLANEOUS
Qty: 100 EACH | Refills: 3 | Status: SHIPPED | OUTPATIENT
Start: 2022-10-18

## 2022-10-18 RX ORDER — AZITHROMYCIN 250 MG/1
TABLET, FILM COATED ORAL
Qty: 1 PACKET | Refills: 0 | Status: SHIPPED | OUTPATIENT
Start: 2022-10-18 | End: 2022-10-28

## 2022-10-18 RX ORDER — BENZONATATE 100 MG/1
100 CAPSULE ORAL 3 TIMES DAILY PRN
Qty: 30 CAPSULE | Refills: 0 | Status: SHIPPED | OUTPATIENT
Start: 2022-10-18 | End: 2022-10-25

## 2022-10-18 RX ORDER — ERGOCALCIFEROL 1.25 MG/1
CAPSULE ORAL
Qty: 12 CAPSULE | Refills: 0 | Status: SHIPPED | OUTPATIENT
Start: 2022-10-18

## 2022-10-18 RX ORDER — EPINEPHRINE 0.3 MG/.3ML
0.3 INJECTION SUBCUTANEOUS ONCE
Qty: 1 EACH | Refills: 3 | Status: SHIPPED | OUTPATIENT
Start: 2022-10-18 | End: 2022-10-18

## 2022-10-18 RX ORDER — DIPHENHYDRAMINE HYDROCHLORIDE 25 MG/1
CAPSULE, LIQUID FILLED ORAL
Qty: 1 KIT | Refills: 0 | Status: SHIPPED | OUTPATIENT
Start: 2022-10-18

## 2022-10-18 RX ORDER — ASCORBIC ACID 500 MG
500 TABLET ORAL DAILY
COMMUNITY

## 2022-10-18 RX ORDER — DEXTROMETHORPHAN HYDROBROMIDE AND PROMETHAZINE HYDROCHLORIDE 15; 6.25 MG/5ML; MG/5ML
5 SYRUP ORAL 4 TIMES DAILY PRN
Qty: 120 ML | Refills: 0 | Status: SHIPPED | OUTPATIENT
Start: 2022-10-18

## 2022-10-18 RX ORDER — GLUCOSAMINE HCL/CHONDROITIN SU 500-400 MG
CAPSULE ORAL
Qty: 100 STRIP | Refills: 3 | Status: SHIPPED | OUTPATIENT
Start: 2022-10-18

## 2022-10-18 NOTE — TELEPHONE ENCOUNTER
Medication:   Requested Prescriptions     Pending Prescriptions Disp Refills    metFORMIN (GLUCOPHAGE) 500 MG tablet [Pharmacy Med Name: METFORMIN 500MG TABLETS] 90 tablet      Sig: TAKE 1 TABLET BY MOUTH DAILY WITH BREAKFAST    fluticasone (FLONASE) 50 MCG/ACT nasal spray [Pharmacy Med Name: FLUTICASONE 50MCG NASAL SP (120) RX] 48 g      Sig: SHAKE LIQUID AND USE 2 SPRAYS IN EACH NOSTRIL DAILY    vitamin D (ERGOCALCIFEROL) 1.25 MG (72341 UT) CAPS capsule [Pharmacy Med Name: VITAMIN D2 50,000IU (ERGO) CAP RX] 12 capsule      Sig: TAKE 1 CAPSULE BY MOUTH 1 TIME A WEEK       Last Filled:      Patient Phone Number: [phone] (home) [phone] (work)    Last appt: 10/18/2022   Next appt: 1/18/2023    Last Labs DM:   Lab Results   Component Value Date/Time    LABA1C 7.5 10/01/2022 10:06 AM       Last OARRS: No flowsheet data found. Preferred Pharmacy:   [address], [address] [place] [id number]  [address] [place] [id number]  Phone: [phone] Fax: [phone]  Medication:   Requested Prescriptions     Pending Prescriptions Disp Refills    metFORMIN (GLUCOPHAGE) 500 MG tablet [Pharmacy Med Name: METFORMIN 500MG TABLETS] 90 tablet      Sig: TAKE 1 TABLET BY MOUTH DAILY WITH BREAKFAST    fluticasone (FLONASE) 50 MCG/ACT nasal spray [Pharmacy Med Name: FLUTICASONE 50MCG NASAL SP (120) RX] 48 g      Sig: SHAKE LIQUID AND USE 2 SPRAYS IN EACH NOSTRIL DAILY    vitamin D (ERGOCALCIFEROL) 1.25 MG (28953 UT) CAPS capsule [Pharmacy Med Name: VITAMIN D2 50,000IU (ERGO) CAP RX] 12 capsule      Sig: TAKE 1 CAPSULE BY MOUTH 1 TIME A WEEK       Last Filled:      Patient Phone Number: [phone] (home) [phone] (work)    Last appt: 10/18/2022   Next appt: 1/18/2023    Last Labs DM:   Lab Results   Component Value Date/Time    LABA1C 7.5 10/01/2022 10:06 AM       Last OARRS: No flowsheet data found.     Preferred Pharmacy:   [id number] [address] [address]  Phone: [phone] Fax: [phone]

## 2022-10-18 NOTE — PATIENT INSTRUCTIONS
-Limit carbohydrates to 45 grams with meals and 15 grams with snacks  -monitor blood sugars  -goal for blood sugar fasting or pre-meal  is   -goal for blood sugar 2 hours after a meal is less than 180  -goal for blood sugar at bedtime is less than 150  -Regular aerobic exercise

## 2022-10-18 NOTE — PROGRESS NOTES
Nellie Lucero   Date ofBirth:  1960    Date of Visit:  10/18/2022    Chief Complaint   Patient presents with    Cough     Symptoms started 10/6/22. Took @ home covid test 10/9/22 it was negative. Productive cough, yellow mucous. Headache     Dull, constant     Sinus Problem    Diabetes     Labs ordered by Dr. Joe Zelaya    Other     Ingrown Toenail       HPI  Patient complains of symptoms since 10/6/22 of productive cough with yellow phlegm, coughing spells, headache, stuffy nose, runny nose, left ear pain that radiates to jaw, sinus pressure, post nasal drainage, sneezing, and SOB. Patient denies wheezing. Patient states she took a Covid test on 10/9/22 and it was negative. Patient has been taking Advil cold and sinus, Robitussin DM, and Albuterol inhaler. Patient has new onset diabetes. Patient had a hemoglobin A1c of 7.5% on 10/1/22. Patient decreases carbohydrates. Patient states she likes chips but cut down. Patient states she doesn't eat of bread. Patient has hyperlipidemia. Patient states Cardiology increased her Rosuvastatin to 40mg once daily. Patient tries to decrease fat and cholesterol. Patient complains of ingrown toenail. Patient states she tried to cut her toenail that is growing into her skin and she has pain around it.     Allergies   Allergen Reactions    Bee Venom Swelling    Fish-Derived Products Swelling    Other Swelling     TREE NUTS    Shellfish Allergy Swelling    Tylenol [Acetaminophen]      Tylenol 3 makes pt itch     Outpatient Medications Marked as Taking for the 10/18/22 encounter (Office Visit) with Angela Kaufman MD   Medication Sig Dispense Refill    EPINEPHrine (EPIPEN 2-MELANIE) 0.3 MG/0.3ML SOAJ injection Inject 0.3 mLs into the skin once for 1 dose Use as directed for allergic reaction 1 each 3    vitamin C (ASCORBIC ACID) 500 MG tablet Take 500 mg by mouth daily      rosuvastatin (CRESTOR) 10 MG tablet Take 4 tablets by mouth daily 90 tablet 3 DANDELION ROOT PO Take by mouth      MAGNESIUM PO Take by mouth      losartan-hydroCHLOROthiazide (HYZAAR) 50-12.5 MG per tablet Take 1 tablet by mouth daily 30 tablet 1    beclomethasone (QVAR REDIHALER) 40 MCG/ACT AERB inhaler Inhale 1 puff into the lungs in the morning and 1 puff in the evening. 10.6 g 1    albuterol sulfate HFA (PROVENTIL HFA) 108 (90 Base) MCG/ACT inhaler Inhale 2 puffs into the lungs every 6 hours as needed for Wheezing 18 g 1    VITAMIN D, CHOLECALCIFEROL, PO ergocalciferol (vitamin D2) 1,250 mcg (50,000 unit) capsule      Turmeric 500 MG CAPS Take by mouth daily      b complex vitamins capsule Take 1 capsule by mouth daily      vitamin B-12 (CYANOCOBALAMIN) 1000 MCG tablet Take 1,000 mcg by mouth daily      ELDERBERRY PO Take by mouth      loratadine (CLARITIN) 10 MG tablet Take 1 tablet by mouth daily 30 tablet 0         Vitals:    10/18/22 0728   BP: 112/74   Pulse: 61   Resp: 20   Temp: 97.1 °F (36.2 °C)   SpO2: 99%   Weight: 237 lb (107.5 kg)     Body mass index is 44.78 kg/m². Physical Exam  Nursing note reviewed. Constitutional:       General: She is not in acute distress. Appearance: Normal appearance. She is well-developed. HENT:      Right Ear: Tympanic membrane and ear canal normal.      Left Ear: Tympanic membrane and ear canal normal.   Eyes:      General: Lids are normal.      Extraocular Movements: Extraocular movements intact. Conjunctiva/sclera: Conjunctivae normal.      Pupils: Pupils are equal, round, and reactive to light. Neck:      Thyroid: No thyromegaly. Vascular: No carotid bruit. Cardiovascular:      Rate and Rhythm: Normal rate and regular rhythm. Heart sounds: Normal heart sounds, S1 normal and S2 normal. No murmur heard. No friction rub. No gallop. Pulmonary:      Effort: Pulmonary effort is normal. No respiratory distress. Breath sounds: Normal breath sounds. No wheezing, rhonchi or rales.    Abdominal:      General: Bowel sounds are normal. There is no distension. Palpations: Abdomen is soft. Tenderness: no abdominal tenderness There is no rebound. Musculoskeletal:      Cervical back: Neck supple. Right lower leg: No edema. Left lower leg: No edema. Lymphadenopathy:      Head:      Right side of head: No submandibular adenopathy. Left side of head: No submandibular adenopathy. Skin:     Comments: No foot ulcers, toenails thick and dark, ingrown toenail right great toenail   Neurological:      Mental Status: She is alert and oriented to person, place, and time. Comments: Bilateral foot monofilament exam normal   Psychiatric:         Mood and Affect: Mood normal.         No results found for this visit on 10/18/22.   Lab Review   Hospital Outpatient Visit on 10/01/2022   Component Date Value    Vit D, 25-Hydroxy 10/01/2022 25.0 (A)     TSH 10/01/2022 0.88     Cholesterol, Total 10/01/2022 213 (A)     Triglycerides 10/01/2022 87     HDL 10/01/2022 66 (A)     LDL Calculated 10/01/2022 130 (A)     VLDL Cholesterol Calcula* 10/01/2022 17     Hemoglobin A1C 10/01/2022 7.5     eAG 10/01/2022 168.6     Sodium 10/01/2022 136     Potassium 10/01/2022 4.0     Chloride 10/01/2022 100     CO2 10/01/2022 27     Anion Gap 10/01/2022 9     Glucose 10/01/2022 120 (A)     BUN 10/01/2022 10     Creatinine 10/01/2022 0.8     GFR Non- 10/01/2022 >60     GFR  10/01/2022 >60     Calcium 10/01/2022 9.2     Total Protein 10/01/2022 7.2     Albumin 10/01/2022 4.3     Albumin/Globulin Ratio 10/01/2022 1.5     Total Bilirubin 10/01/2022 0.3     Alkaline Phosphatase 10/01/2022 108     ALT 10/01/2022 25     AST 10/01/2022 22     WBC 10/01/2022 7.3     RBC 10/01/2022 4.71     Hemoglobin 10/01/2022 12.6     Hematocrit 10/01/2022 39.4     MCV 10/01/2022 83.6     MCH 10/01/2022 26.8     MCHC 10/01/2022 32.0     RDW 10/01/2022 14.4     Platelets 72/22/4168 346     MPV 10/01/2022 8.1     Neutrophils % 10/01/2022 59.9     Lymphocytes % 10/01/2022 26.0     Monocytes % 10/01/2022 11.2     Eosinophils % 10/01/2022 2.0     Basophils % 10/01/2022 0.9     Neutrophils Absolute 10/01/2022 4.2     Lymphocytes Absolute 10/01/2022 1.8     Monocytes Absolute 10/01/2022 0.8     Eosinophils Absolute 10/01/2022 0.1     Basophils Absolute 10/01/2022 0.1          Assessment/Plan     1. Upper respiratory tract infection, unspecified type  - azithromycin (ZITHROMAX) 250 MG tablet; Take 2 tabs (500 mg) on Day 1, and take 1 tab (250 mg) on days 2 through 5. Dispense: 1 packet; Refill: 0  - benzonatate (TESSALON PERLES) 100 MG capsule; Take 1 capsule by mouth 3 times daily as needed for Cough  Dispense: 30 capsule; Refill: 0  - promethazine-dextromethorphan (PROMETHAZINE-DM) 6.25-15 MG/5ML syrup; Take 5 mLs by mouth 4 times daily as needed for Cough  Dispense: 120 mL; Refill: 0  - fluticasone (FLONASE) 50 MCG/ACT nasal spray; 2 sprays by Nasal route daily  Dispense: 16 g; Refill: 0    2. Shortness of breath  - XR CHEST (2 VW); Future  -Continue albuterol inhaler 2 puffs every 6 hours as needed    3. Cough, unspecified type  - XR CHEST (2 VW); Future  - benzonatate (TESSALON PERLES) 100 MG capsule; Take 1 capsule by mouth 3 times daily as needed for Cough  Dispense: 30 capsule; Refill: 0  - promethazine-dextromethorphan (PROMETHAZINE-DM) 6.25-15 MG/5ML syrup; Take 5 mLs by mouth 4 times daily as needed for Cough  Dispense: 120 mL; Refill: 0    4. Type 2 diabetes mellitus without complication, without long-term current use of insulin (HCC)  -Hemoglobin A1c of 7.5%  -Start metFORMIN (GLUCOPHAGE) 500 MG tablet; Take 1 tablet by mouth daily (with breakfast)  Dispense: 30 tablet;  Refill: 3  -Limit carbohydrates to 45 grams with meals and 15 grams with snacks  -monitor blood sugars  -goal for blood sugar fasting or pre-meal  is   -goal for blood sugar 2 hours after a meal is less than 180  -goal for blood sugar at bedtime is less than 150  -Regular aerobic exercise  -Referral to [facility] Individual Diabetes Education (Non Care Coord Patient), [facility]  - Microalbumin / Creatinine Urine Ratio  -  DIABETES FOOT EXAM  - Blood Glucose Monitoring Suppl (BLOOD GLUCOSE MONITOR SYSTEM) w/Device KIT; Dispense glucometer covered by patient's insurance  Dispense: 1 kit; Refill: 0  - blood glucose monitor strips; Test once daily  Dispense: 100 strip; Refill: 3  - Lancets MISC; Use to test blood sugar once daily  Dispense: 100 each; Refill: 3  -Referral to [facility] - Heath Ordonez MD, (Comprehensive Ophthalmology, Cataract Surgery) Ophthalmology, [facility] for diabetic eye exam    5. Vitamin D deficiency  -Vitamin D is low  -Start vitamin D (ERGOCALCIFEROL) 1.25 MG (60678 UT) CAPS capsule; Take 1 capsule by mouth once a week  Dispense: 4 capsule; Refill: 3    6. Ingrown toenail  -Ingrown toenail right great toe  -Referral to [facility] - Yanelis Mix DPM, Podiatry, [facility]    7. Allergic to bees  -Refilled EPINEPHrine (EPIPEN 2-MELANIE) 0.3 MG/0.3ML SOAJ injection; Inject 0.3 mLs into the skin once for 1 dose Use as directed for allergic reaction  Dispense: 1 each; Refill: 3    8. Allergy to tree nuts  -Refilled EPINEPHrine (EPIPEN 2-MELANIE) 0.3 MG/0.3ML SOAJ injection; Inject 0.3 mLs into the skin once for 1 dose Use as directed for allergic reaction  Dispense: 1 each; Refill: 3    9. Allergy to shellfish  -Refilled EPINEPHrine (EPIPEN 2-MELANIE) 0.3 MG/0.3ML SOAJ injection; Inject 0.3 mLs into the skin once for 1 dose Use as directed for allergic reaction  Dispense: 1 each; Refill: 3        Discussed medications with patient, who voiced understanding of their use and indications. All questions answered. Return in about 3 months (around 1/18/2023) for diabetes and hyperlipidemia.

## 2022-10-18 NOTE — TELEPHONE ENCOUNTER
Prescription for Flonase nasal spray 90 day supply denied because it is for an upper respiratory infection.

## 2022-10-28 ENCOUNTER — TELEPHONE (OUTPATIENT)
Dept: CARDIOLOGY CLINIC | Age: 62
End: 2022-10-28

## 2022-10-28 ENCOUNTER — HOSPITAL ENCOUNTER (OUTPATIENT)
Dept: NON INVASIVE DIAGNOSTICS | Age: 62
Discharge: HOME | End: 2022-10-28
Payer: COMMERCIAL

## 2022-10-28 DIAGNOSIS — G47.33 OSA (OBSTRUCTIVE SLEEP APNEA): ICD-10-CM

## 2022-10-28 DIAGNOSIS — R06.02 SOB (SHORTNESS OF BREATH): ICD-10-CM

## 2022-10-28 DIAGNOSIS — R07.89 ATYPICAL CHEST PAIN: ICD-10-CM

## 2022-10-28 LAB
LV EF: 58 %
LV EF: 72 %
LVEF MODALITY: NORMAL
LVEF MODALITY: NORMAL

## 2022-10-28 PROCEDURE — 93017 CV STRESS TEST TRACING ONLY: CPT | Performed by: INTERNAL MEDICINE

## 2022-10-28 PROCEDURE — 3430000000 HC RX DIAGNOSTIC RADIOPHARMACEUTICAL: Performed by: INTERNAL MEDICINE

## 2022-10-28 PROCEDURE — A9502 TC99M TETROFOSMIN: HCPCS | Performed by: INTERNAL MEDICINE

## 2022-10-28 PROCEDURE — 93306 TTE W/DOPPLER COMPLETE: CPT

## 2022-10-28 PROCEDURE — 78452 HT MUSCLE IMAGE SPECT MULT: CPT

## 2022-10-28 RX ADMIN — TETROFOSMIN 10 MILLICURIE: 1.38 INJECTION, POWDER, LYOPHILIZED, FOR SOLUTION INTRAVENOUS at 07:48

## 2022-10-28 RX ADMIN — TETROFOSMIN 30 MILLICURIE: 1.38 INJECTION, POWDER, LYOPHILIZED, FOR SOLUTION INTRAVENOUS at 08:45

## 2022-10-28 NOTE — TELEPHONE ENCOUNTER
Dr Caryn Barboza reviewed her stress results and reported to me they were normal. I called patient as it is Dr Rodger Davila patient. There was no answer, LMOM that her stress was normal and to call us back with any questions or concerns.

## 2022-10-28 NOTE — PROGRESS NOTES
Patient instructed on [facility] Protocol Stress Test Procedure including possible side effects and adverse reactions. Verbalizes knowledge and understanding and denies having any questions.

## 2022-11-09 PROBLEM — R05.9 COUGH: Status: RESOLVED | Noted: 2022-10-10 | Resolved: 2022-11-09

## 2022-11-11 DIAGNOSIS — I10 PRIMARY HYPERTENSION: ICD-10-CM

## 2022-11-11 RX ORDER — LOSARTAN POTASSIUM AND HYDROCHLOROTHIAZIDE 12.5; 5 MG/1; MG/1
1 TABLET ORAL DAILY
Qty: 90 TABLET | Refills: 0 | Status: SHIPPED | OUTPATIENT
Start: 2022-11-11

## 2022-11-11 NOTE — TELEPHONE ENCOUNTER
Medication:   Requested Prescriptions     Pending Prescriptions Disp Refills    losartan-hydroCHLOROthiazide (HYZAAR) 50-12.5 MG per tablet [Pharmacy Med Name: LOSARTAN/HCTZ 50/12.5MG TABLETS] 90 tablet      Sig: TAKE 1 TABLET BY MOUTH DAILY     Last Filled: 9.26.22    Last appt: 10/18/2022   Next appt: 1/18/2023    Last OARRS: No flowsheet data found.

## 2022-11-29 ENCOUNTER — OFFICE VISIT (OUTPATIENT)
Dept: ENDOCRINOLOGY | Age: 62
End: 2022-11-29

## 2022-11-29 DIAGNOSIS — E11.9 TYPE 2 DIABETES MELLITUS WITHOUT COMPLICATION, WITHOUT LONG-TERM CURRENT USE OF INSULIN (HCC): Primary | ICD-10-CM

## 2022-11-29 NOTE — PROGRESS NOTES
Medical Nutrition Therapy for Diabetes    Kimberly White  November 29, 2022      Patient Care Team:  Sandra Osman MD as PCP - General (Internal Medicine)  Sandra Osman MD as PCP - Internal Medicine (Internal Medicine)  Sandra Osman MD as PCP - [facility] Empaneled Provider  Sandra Osman MD as Referring Physician (Internal Medicine)  Jamia Saunders MD as Consulting Physician (Sleep Medicine [facility])    Reason for visit: Type 2 Diabetes    ASSESSMENT/PLAN:   NUTRITION DIAGNOSIS  Initial Visit    #1 Problem: Altered Nutrition-Related Laboratory Values (NC-2.2)  Related to: Endocrine/Diabetes   As Evidenced by: Elevated Plasma glucose and/or HgbA1c levels          #2 Problem: Excessive Carbohydrate Intake (NI-5.8. 2)  Related to: Food-and nutrition-related knowledge deficit concerning appropriate amount of carbohydrate intake  As evidenced by: Estimated carbohydrate intake that is consistently more than the recommended amounts     #3 Problem: Knowledge and Beliefs-NB-3.1                       Food and nutrition deficits     NUTRITION INTERVENTION  Nutrition Prescription: 45 grams carbohydrate per meal with protein and non-starch vegetables  15 gram carbohydrate snack (1 snack per day)     Diabetes Education/Counseling included: Physiology of Glucose, needs Pathophysiology of Diabetes   Carbohydrate Control, Activity/exercise, Label-reading, Monitoring, and Medications  Demonstrated method for monitoring. Interventions:  Control Carbohydrate Intake using Plate Guide  Recommended 3 meals spaced 4-5 hours apart and afternoon snack. Encouraged reducing portions. Suggested checking fasting glucose.   Handouts: Healthy Eating Guidelines for Diabetes-[employer], Sample menus-[employer], Planning Healthy Meals-[employer]   NUTRITION MONITORING AND EVALUATION  3 meals and afternoon snack  45 gram carbohydrate meals        Patient Active Problem List   Diagnosis    Generalized anxiety disorder Low back pain    Bleeding hemorrhoids    Right hip pain    Sleep apnea    SOB (shortness of breath)    Hyperlipidemia    Dermatitis    Allergic rhinitis    LANE (obstructive sleep apnea)    Obesity (BMI 30-39. 9)    Retrocalcaneal bursitis (back of heel)    Achilles tendinitis, right leg    AC joint arthropathy    Prediabetes    Primary insomnia    Depression    Atypical chest pain    Hypertension    Chest pain    Shortness of breath    Dizziness    Numbness and tingling of left leg    Phlegm in throat    Upper respiratory tract infection    Type 2 diabetes mellitus without complication, without long-term current use of insulin ([facility])    Vitamin D deficiency    Ingrown toenail       Current Outpatient Medications   Medication Sig Dispense Refill    losartan-hydroCHLOROthiazide (HYZAAR) 50-12.5 MG per tablet TAKE 1 TABLET BY MOUTH DAILY 90 tablet 0    EPINEPHrine (EPIPEN 2-MELANIE) 0.3 MG/0.3ML SOAJ injection Inject 0.3 mLs into the skin once for 1 dose Use as directed for allergic reaction 1 each 3    vitamin C (ASCORBIC ACID) 500 MG tablet Take 500 mg by mouth daily      promethazine-dextromethorphan (PROMETHAZINE-DM) 6.25-15 MG/5ML syrup Take 5 mLs by mouth 4 times daily as needed for Cough 120 mL 0    fluticasone (FLONASE) 50 MCG/ACT nasal spray 2 sprays by Nasal route daily 16 g 0    Blood Glucose Monitoring Suppl (BLOOD GLUCOSE MONITOR SYSTEM) w/Device KIT Dispense glucometer covered by patient's insurance 1 kit 0    blood glucose monitor strips Test once daily 100 strip 3    Lancets MISC Use to test blood sugar once daily 100 each 3    metFORMIN (GLUCOPHAGE) 500 MG tablet TAKE 1 TABLET BY MOUTH DAILY WITH BREAKFAST 90 tablet 0    vitamin D (ERGOCALCIFEROL) 1.25 MG (65136 UT) CAPS capsule TAKE 1 CAPSULE BY MOUTH 1 TIME A WEEK 12 capsule 0    rosuvastatin (CRESTOR) 10 MG tablet Take 4 tablets by mouth daily 90 tablet 3    DANDELION ROOT PO Take by mouth      MAGNESIUM PO Take by mouth      beclomethasone (QVAR REDIHALER) 40 MCG/ACT AERB inhaler Inhale 1 puff into the lungs in the morning and 1 puff in the evening. 10.6 g 1    albuterol sulfate HFA (PROVENTIL HFA) 108 (90 Base) MCG/ACT inhaler Inhale 2 puffs into the lungs every 6 hours as needed for Wheezing 18 g 1    VITAMIN D, CHOLECALCIFEROL, PO ergocalciferol (vitamin D2) 1,250 mcg (50,000 unit) capsule      Turmeric 500 MG CAPS Take by mouth daily      b complex vitamins capsule Take 1 capsule by mouth daily      vitamin B-12 (CYANOCOBALAMIN) 1000 MCG tablet Take 1,000 mcg by mouth daily      ELDERBERRY PO Take by mouth      loratadine (CLARITIN) 10 MG tablet Take 1 tablet by mouth daily 30 tablet 0     No current facility-administered medications for this visit. NUTRITION ASSESSMENT    Biochemical Data:    Lab Results   Component Value Date    LABA1C 7.5 10/01/2022     Lab Results   Component Value Date    .6 10/01/2022       Lab Results   Component Value Date    CHOL 213 (H) 10/01/2022    CHOL 265 (H) 07/01/2021    CHOL 279 (H) 06/11/2019     Lab Results   Component Value Date    TRIG 87 10/01/2022    TRIG 79 07/01/2021    TRIG 63 06/11/2019     Lab Results   Component Value Date    HDL 66 (H) 10/01/2022    HDL 68 (H) 07/01/2021    HDL 74 (H) 06/11/2019     Lab Results   Component Value Date    LDLCALC 130 (H) 10/01/2022    LDLCALC 181 (H) 07/01/2021    LDLCALC 192 (H) 06/11/2019     Lab Results   Component Value Date    LABVLDL 17 10/01/2022    LABVLDL 16 07/01/2021    LABVLDL 13 06/11/2019     No results found for: CHOLHDLRATIO    Lab Results   Component Value Date    WBC 7.3 10/01/2022    HGB 12.6 10/01/2022    HCT 39.4 10/01/2022    MCV 83.6 10/01/2022     10/01/2022       Lab Results   Component Value Date    CREATININE 0.8 10/01/2022    BUN 10 10/01/2022     10/01/2022    K 4.0 10/01/2022     10/01/2022    CO2 27 10/01/2022       Diabetes Medications:  Yes  Knows name and dose of prescribed medications Yes  Knows prescribed schedule for medicationsNo  Recent change in medication type/dosage: Yes  Stores  medications properlyYes  Comments:     Monitoring:   Has BG meter: Yes  Testing frequency: not testing yet  Recent results:   Hypoglycemia? Anthropometric Measurements: Wt:   Wt Readings from Last 3 Encounters:   10/18/22 237 lb (107.5 kg)   10/07/22 239 lb 9.6 oz (108.7 kg)   09/26/22 238 lb 12.8 oz (108.3 kg)      BMI:   BMI Readings from Last 3 Encounters:   10/18/22 44.78 kg/m²   10/07/22 45.27 kg/m²   09/26/22 45.12 kg/m²     Patient's stated goal weight:   7% Weight loss goal weight:     Physical Activity History:   Physical activity: gym, dance  Frequency of activity: 3/week  Duration of activity: 60 minutes  Obstacles to activity: none    Sleep: fair    Food and Nutrition History:   Nutrition Awareness/Previous DSMES: No  Number of people in household: 1  Frequency of Meals Eaten away from home:daily 2 meals during work week  Food Availability Problems  Within the past 12 months, have you worried that your food would run out before you got money to buy more? No  Within the past 12 months, has the food you bought not lasted till the end of the month and you didn't have money to get more? No  Beverage consumption: water - 32-64 ounces, lemonade-more than would like  Alcohol consumption: yes, wine seldom  Usual Food consumption:   3 meals, late evening dinner, larger amount eaten at late meal, 45-60 gram carbohydrate meals, 30 gram carbohydrate snacks     Barriers:   -none          Follow Up Plan: 6-8 weeks Will remain available. Contact information given.      Referring Provider: Geraldo Cannon MD  Time spent with patient: 60 minutes

## 2022-12-01 ENCOUNTER — OFFICE VISIT (OUTPATIENT)
Dept: PULMONOLOGY | Age: 62
End: 2022-12-01
Payer: COMMERCIAL

## 2022-12-01 ENCOUNTER — HOSPITAL ENCOUNTER (OUTPATIENT)
Age: 62
Discharge: HOME | End: 2022-12-01
Payer: COMMERCIAL

## 2022-12-01 VITALS
HEIGHT: 61 IN | DIASTOLIC BLOOD PRESSURE: 66 MMHG | OXYGEN SATURATION: 100 % | WEIGHT: 240 LBS | SYSTOLIC BLOOD PRESSURE: 112 MMHG | BODY MASS INDEX: 45.31 KG/M2 | HEART RATE: 85 BPM

## 2022-12-01 DIAGNOSIS — R06.09 DOE (DYSPNEA ON EXERTION): ICD-10-CM

## 2022-12-01 DIAGNOSIS — R06.09 DOE (DYSPNEA ON EXERTION): Primary | ICD-10-CM

## 2022-12-01 PROCEDURE — 3078F DIAST BP <80 MM HG: CPT | Performed by: INTERNAL MEDICINE

## 2022-12-01 PROCEDURE — 99204 OFFICE O/P NEW MOD 45 MIN: CPT | Performed by: INTERNAL MEDICINE

## 2022-12-01 PROCEDURE — 3074F SYST BP LT 130 MM HG: CPT | Performed by: INTERNAL MEDICINE

## 2022-12-01 PROCEDURE — 82785 ASSAY OF IGE: CPT

## 2022-12-01 PROCEDURE — 86003 ALLG SPEC IGE CRUDE XTRC EA: CPT

## 2022-12-01 ASSESSMENT — ENCOUNTER SYMPTOMS
APNEA: 0
CHEST TIGHTNESS: 0
ABDOMINAL DISTENTION: 0
ANAL BLEEDING: 0
WHEEZING: 0
STRIDOR: 0
COUGH: 1
CHOKING: 0
RHINORRHEA: 0
SHORTNESS OF BREATH: 1
CONSTIPATION: 0
SINUS PRESSURE: 0
ABDOMINAL PAIN: 0
BLOOD IN STOOL: 0
VOICE CHANGE: 0
SORE THROAT: 0
DIARRHEA: 0

## 2022-12-02 ENCOUNTER — OFFICE VISIT (OUTPATIENT)
Dept: ENT CLINIC | Age: 62
End: 2022-12-02
Payer: COMMERCIAL

## 2022-12-02 VITALS
HEIGHT: 61 IN | HEART RATE: 102 BPM | WEIGHT: 239 LBS | SYSTOLIC BLOOD PRESSURE: 125 MMHG | OXYGEN SATURATION: 98 % | DIASTOLIC BLOOD PRESSURE: 76 MMHG | BODY MASS INDEX: 45.12 KG/M2 | TEMPERATURE: 97.7 F

## 2022-12-02 DIAGNOSIS — J30.9 ALLERGIC RHINITIS, UNSPECIFIED SEASONALITY, UNSPECIFIED TRIGGER: ICD-10-CM

## 2022-12-02 DIAGNOSIS — K21.9 LARYNGOPHARYNGEAL REFLUX (LPR): Primary | ICD-10-CM

## 2022-12-02 DIAGNOSIS — J34.3 HYPERTROPHY OF BOTH INFERIOR NASAL TURBINATES: ICD-10-CM

## 2022-12-02 DIAGNOSIS — G47.33 OSA (OBSTRUCTIVE SLEEP APNEA): ICD-10-CM

## 2022-12-02 PROCEDURE — 99204 OFFICE O/P NEW MOD 45 MIN: CPT | Performed by: STUDENT IN AN ORGANIZED HEALTH CARE EDUCATION/TRAINING PROGRAM

## 2022-12-02 PROCEDURE — 3074F SYST BP LT 130 MM HG: CPT | Performed by: STUDENT IN AN ORGANIZED HEALTH CARE EDUCATION/TRAINING PROGRAM

## 2022-12-02 PROCEDURE — 3078F DIAST BP <80 MM HG: CPT | Performed by: STUDENT IN AN ORGANIZED HEALTH CARE EDUCATION/TRAINING PROGRAM

## 2022-12-02 RX ORDER — OMEPRAZOLE 40 MG/1
40 CAPSULE, DELAYED RELEASE ORAL
Qty: 90 CAPSULE | Refills: 1 | Status: SHIPPED | OUTPATIENT
Start: 2022-12-02

## 2022-12-02 RX ORDER — FLUTICASONE PROPIONATE 50 MCG
2 SPRAY, SUSPENSION (ML) NASAL DAILY
Qty: 48 G | Refills: 1 | Status: SHIPPED | OUTPATIENT
Start: 2022-12-02

## 2022-12-02 NOTE — PATIENT INSTRUCTIONS
-Take 40 mg Omeprazole (Prilosec) in morning DAILY 1 hour before breakfast.  -Discussed conservative management lifestyle modification strategies for reflux treatment including:      -Avoidance of late night eating and lying down soon after eating. Consider lying down and sleeping in a reclined position as to reduce the gravity effects of acid reflux.        -Avoidance of trigger foods that could worsen reflux including alcohol, excessively salty foods, spicy foods, acidic foods, chocolate, peppermint, fatty foods, coffee.

## 2022-12-02 NOTE — PROGRESS NOTES
Dominic Lucero    YOB: 1960     Date of Service:  12/1/2022     Chief Complaint   Patient presents with    New Patient     REF BY DR Ruiz Chauhan    Shortness of Breath    Wheezing    Cough     Dry           HPI patient referred for consultation by Chris Madden MD, for evaluation of shortness of breath. This pleasant lady presents with a 6-month history of worsening shortness of breath with exertion. She states that lately she has noticed more shortness of breath with short distances, for example, walking from the parking lot to our office and also with steps. She states that sometimes her shortness of breath is accompanied by cough, feels that the thick phlegm is stuck in her throat and she is unable to expectorate. Denies any chest pain. Denies pedal edema, but confirms significant weight gain of 30 pounds over the last 1 year or so, since she has been mostly work from home and has been increasingly sedentary due to COVID-19 pandemic. She is now very health-conscious, also started exercising-attends the gym/[employer] classes. States that she has some seasonal allergy related symptoms, also allergy to pet dander noted in the past.  Denies any significant asthma history in childhood. Her grandchildren have been diagnosed with asthma. She recently had a cardiac work-up with both a 2D echocardiogram and exercise stress test noted to be normal.  Patient is a lifelong non-smoker. She does have history of hypertension and type 2 diabetes. Has significant seafood and nut allergy. Patient also has untreated sleep apnea, could never tolerate the mask.         Allergies   Allergen Reactions    Bee Venom Swelling    Fish-Derived Products Swelling    Other Swelling     TREE NUTS    Shellfish Allergy Swelling    Tylenol [Acetaminophen]      Tylenol 3 makes pt itch     Outpatient Medications Marked as Taking for the 12/1/22 encounter (Office Visit) with Virginia Rashid MD   Medication Sig Dispense Refill    losartan-hydroCHLOROthiazide (HYZAAR) 50-12.5 MG per tablet TAKE 1 TABLET BY MOUTH DAILY 90 tablet 0    vitamin C (ASCORBIC ACID) 500 MG tablet Take 500 mg by mouth daily      promethazine-dextromethorphan (PROMETHAZINE-DM) 6.25-15 MG/5ML syrup Take 5 mLs by mouth 4 times daily as needed for Cough 120 mL 0    fluticasone (FLONASE) 50 MCG/ACT nasal spray 2 sprays by Nasal route daily 16 g 0    Blood Glucose Monitoring Suppl (BLOOD GLUCOSE MONITOR SYSTEM) w/Device KIT Dispense glucometer covered by patient's insurance 1 kit 0    blood glucose monitor strips Test once daily 100 strip 3    Lancets MISC Use to test blood sugar once daily 100 each 3    metFORMIN (GLUCOPHAGE) 500 MG tablet TAKE 1 TABLET BY MOUTH DAILY WITH BREAKFAST 90 tablet 0    vitamin D (ERGOCALCIFEROL) 1.25 MG (42605 UT) CAPS capsule TAKE 1 CAPSULE BY MOUTH 1 TIME A WEEK 12 capsule 0    rosuvastatin (CRESTOR) 10 MG tablet Take 4 tablets by mouth daily 90 tablet 3    DANDELION ROOT PO Take by mouth      MAGNESIUM PO Take by mouth      beclomethasone (QVAR REDIHALER) 40 MCG/ACT AERB inhaler Inhale 1 puff into the lungs in the morning and 1 puff in the evening.  10.6 g 1    albuterol sulfate HFA (PROVENTIL HFA) 108 (90 Base) MCG/ACT inhaler Inhale 2 puffs into the lungs every 6 hours as needed for Wheezing 18 g 1    VITAMIN D, CHOLECALCIFEROL, PO ergocalciferol (vitamin D2) 1,250 mcg (50,000 unit) capsule      Turmeric 500 MG CAPS Take by mouth daily      b complex vitamins capsule Take 1 capsule by mouth daily      vitamin B-12 (CYANOCOBALAMIN) 1000 MCG tablet Take 1,000 mcg by mouth daily      ELDERBERRY PO Take by mouth      loratadine (CLARITIN) 10 MG tablet Take 1 tablet by mouth daily 30 tablet 0       Immunization History   Administered Date(s) Administered    COVID-19, MODERNA BLUE border, Primary or Immunocompromised, (age 12y+), IM, 100 mcg/0.5mL 07/25/2021, 09/29/2021    Tdap (Boostrix, Adacel) 04/04/2016       Past Medical History:   Diagnosis Date    Allergic rhinitis     Arthrosis of hip     Carpal tunnel syndrome     Colon polyps 13    colonoscopy done    Eczema     GERD (gastroesophageal reflux disease)     Hyperlipidemia     Knee pain     Obstructive sleep apnea (adult) (pediatric)     Osteopenia     Retrocalcaneal bursitis (back of heel) 2017    Unspecified sleep apnea      Past Surgical History:   Procedure Laterality Date    COLONOSCOPY  13    colon polyps and diverticulosis    COLONOSCOPY  2017    Deepti QUIÑONEZ    EYE SURGERY Bilateral 2004    lasix    HEMORRHOID SURGERY      2017    HYSTERECTOMY (CERVIX STATUS UNKNOWN)  2002    complete    OVARY REMOVAL      bilateral - age 42     Family History   Problem Relation Age of Onset    Cancer Mother 77         lung cancer    Other Mother         Schizophrenia    Other Father         Don't know    Cancer Sister          age 61 Uterus &Breast    Asthma Grandchild     Hypertension Daughter     Diabetes Neg Hx     Heart Disease Neg Hx        Review of Systems:  Review of Systems   Constitutional:  Negative for activity change, appetite change, fatigue and fever. HENT:  Negative for congestion, ear discharge, ear pain, postnasal drip, rhinorrhea, sinus pressure, sneezing, sore throat, tinnitus and voice change. Respiratory:  Positive for cough and shortness of breath. Negative for apnea, choking, chest tightness, wheezing and stridor. Cardiovascular:  Negative for chest pain, palpitations and leg swelling. Gastrointestinal:  Negative for abdominal distention, abdominal pain, anal bleeding, blood in stool, constipation and diarrhea. Skin:  Negative for pallor and rash. Allergic/Immunologic: Negative for environmental allergies. Neurological:  Negative for dizziness, tremors, seizures, syncope, speech difficulty, weakness, light-headedness, numbness and headaches. Psychiatric/Behavioral:  Negative for sleep disturbance.       Vitals: 12/01/22 1528   BP: 112/66   Pulse: 85   SpO2: 100%   Weight: 240 lb (108.9 kg)   Height: 5' 1\" (1.549 m)     No flowsheet data found. Body mass index is 45.35 kg/m². Wt Readings from Last 3 Encounters:   12/01/22 240 lb (108.9 kg)   10/18/22 237 lb (107.5 kg)   10/07/22 239 lb 9.6 oz (108.7 kg)     BP Readings from Last 3 Encounters:   12/01/22 112/66   10/18/22 112/74   10/07/22 130/82         Physical Exam  Constitutional:       General: She is not in acute distress. Appearance: She is well-developed. She is obese. She is not ill-appearing or diaphoretic. HENT:      Mouth/Throat:      Pharynx: No oropharyngeal exudate. Cardiovascular:      Rate and Rhythm: Normal rate and regular rhythm. Heart sounds: Normal heart sounds. No murmur heard. No friction rub. Pulmonary:      Effort: No respiratory distress. Breath sounds: No wheezing, rhonchi or rales. Comments: Poor air entry bilaterally  Chest:      Chest wall: No tenderness. Abdominal:      General: There is no distension. Palpations: There is no mass. Tenderness: There is no abdominal tenderness. There is no guarding or rebound. Musculoskeletal:         General: No swelling. Skin:     Coloration: Skin is not pale. Findings: No erythema or rash. Neurological:      Mental Status: She is alert and oriented to person, place, and time. Cranial Nerves: No cranial nerve deficit. Motor: No abnormal muscle tone.       Coordination: Coordination normal.      Deep Tendon Reflexes: Reflexes normal.           Health Maintenance   Topic Date Due    Pneumococcal 0-64 years Vaccine (1 - PCV) Never done    Diabetic retinal exam  Never done    COVID-19 Vaccine (3 - Booster for Moderna series) 11/24/2021    Flu vaccine (1) 09/26/2023 (Originally 8/1/2022)    Shingles vaccine (1 of 2) 09/26/2023 (Originally 5/20/2009)    Depression Monitoring  07/11/2023    A1C test (Diabetic or Prediabetic)  10/01/2023    Lipids 10/01/2023    Diabetic foot exam  10/18/2023    Diabetic microalbuminuria test  10/18/2023    Breast cancer screen  07/09/2024    DTaP/Tdap/Td vaccine (2 - Td or Tdap) 04/04/2026    Colorectal Cancer Screen  02/21/2027    Hepatitis C screen  Completed    HIV screen  Completed    Hepatitis A vaccine  Aged Out    Hib vaccine  Aged Out    Meningococcal (ACWY) vaccine  Aged Out          Assessment/Plan:     Diagnosis Orders   1. DORADO (dyspnea on exertion)  Respiratory allergen profile    FULL PFT STUDY WITH METHACHOLINE           Patient's shortness of breath with exertion, may very well be related to recent weight gain. Patient has a BMI of 45. She is very much aware of this and is pursuing exercise therapy as a form of remediation. She is also aware of weight loss surgery as an option. Based on her history of allergies and cough associated with shortness of breath, we would pursue complete PFT study with methacholine challenge test in order to rule out asthma. Patient states that she has used Qvar redihaler which has not helped, though albuterol inhaler does bring some relief to her shortness of breath. I would not make any new recommendations about inhaler therapy until we have the results of the methacholine challenge test.  I would also request for respiratory allergen panel to evaluate/confirm allergies. Prior CBC did not reveal elevated eosinophil count. Discussed about management of LANE, patient not very keen on exploring this further at this time. Thanks for the referral.  Patient will be reviewed once again in 1 month. Return in about 1 month (around 1/1/2023).

## 2022-12-02 NOTE — PROGRESS NOTES
[facility] PATIENT HISTORY AND PHYSICAL NOTE      Patient Name: Anitha Burger Record Number:  [id number]  Primary Care Physician:  Michael Sue MD    ChiefComplaint     Chief Complaint   Patient presents with    Pharyngitis     Pt c/o feels like something is stuck in throat, always clearing throat/forcing cough, and pt states lots of mucus in throat. Pt states she has sleep apnea. History of Present Illness     Shireen Fraire is an 60 y.o. female presenting with copious amounts of mucus in her throat. She clears her throat often and all day long. Takes TUMS as needed at night depending on what she eats for reflux. + hx of LANE, does not wear CPAP because it gave her anxiety. No dysphagia. Will occasionally eat late at night. Has gained 30 lbs over the past 1.5 years with the pandemic and working from home. Never smoker. Reports seasonal allergies. Takes sudafed or Claritin as needed. Used flonase in the past only as needed basis. Seen by Dr. Ash Barrett, yesterday for shortness of breath with exertion.   Planning for PFT with methacholine challenge test.    Past Medical History     Past Medical History:   Diagnosis Date    Allergic rhinitis     Arthrosis of hip     Carpal tunnel syndrome     Colon polyps 6/4/14    colonoscopy done    Eczema     GERD (gastroesophageal reflux disease)     Hyperlipidemia     Knee pain     Obstructive sleep apnea (adult) (pediatric)     Osteopenia     Retrocalcaneal bursitis (back of heel) 7/20/2017    Unspecified sleep apnea        Past Surgical History     Past Surgical History:   Procedure Laterality Date    COLONOSCOPY  6/14/14    colon polyps and diverticulosis    COLONOSCOPY  02/21/2017    Deepti QUIÑONEZ    EYE SURGERY Bilateral 4/2004    lasix    HEMORRHOID SURGERY      2/2017    HYSTERECTOMY (CERVIX STATUS UNKNOWN)  1/2002    complete    OVARY REMOVAL      bilateral - age 42       Family History     Family History   Problem Relation Age of Onset    Cancer Mother 77         lung cancer    Other Mother         Schizophrenia    Other Father         Don't know    Cancer Sister          age 61 Uterus &Breast    Asthma Grandchild     Hypertension Daughter     Diabetes Neg Hx     Heart Disease Neg Hx        Social History     Social History     Tobacco Use    Smoking status: Never    Smokeless tobacco: Never   Vaping Use    Vaping Use: Never used   Substance Use Topics    Alcohol use: No    Drug use: No        Allergies     Allergies   Allergen Reactions    Bee Venom Swelling    Fish-Derived Products Swelling    Other Swelling     TREE NUTS    Shellfish Allergy Swelling    Tylenol [Acetaminophen]      Tylenol 3 makes pt itch       Medications     Current Outpatient Medications   Medication Sig Dispense Refill    omeprazole (PRILOSEC) 40 MG delayed release capsule Take 1 capsule by mouth every morning (before breakfast) 90 capsule 1    fluticasone (FLONASE) 50 MCG/ACT nasal spray 2 sprays by Each Nostril route daily 48 g 1    losartan-hydroCHLOROthiazide (HYZAAR) 50-12.5 MG per tablet TAKE 1 TABLET BY MOUTH DAILY 90 tablet 0    vitamin C (ASCORBIC ACID) 500 MG tablet Take 500 mg by mouth daily      promethazine-dextromethorphan (PROMETHAZINE-DM) 6.25-15 MG/5ML syrup Take 5 mLs by mouth 4 times daily as needed for Cough 120 mL 0    Blood Glucose Monitoring Suppl (BLOOD GLUCOSE MONITOR SYSTEM) w/Device KIT Dispense glucometer covered by patient's insurance 1 kit 0    blood glucose monitor strips Test once daily 100 strip 3    Lancets MISC Use to test blood sugar once daily 100 each 3    metFORMIN (GLUCOPHAGE) 500 MG tablet TAKE 1 TABLET BY MOUTH DAILY WITH BREAKFAST 90 tablet 0    vitamin D (ERGOCALCIFEROL) 1.25 MG (23007 UT) CAPS capsule TAKE 1 CAPSULE BY MOUTH 1 TIME A WEEK 12 capsule 0    rosuvastatin (CRESTOR) 10 MG tablet Take 4 tablets by mouth daily 90 tablet 3    DANDELION ROOT PO Take by mouth      MAGNESIUM PO Take by mouth      beclomethasone (QVAR REDIHALER) 40 MCG/ACT AERB inhaler Inhale 1 puff into the lungs in the morning and 1 puff in the evening. 10.6 g 1    albuterol sulfate HFA (PROVENTIL HFA) 108 (90 Base) MCG/ACT inhaler Inhale 2 puffs into the lungs every 6 hours as needed for Wheezing 18 g 1    VITAMIN D, CHOLECALCIFEROL, PO ergocalciferol (vitamin D2) 1,250 mcg (50,000 unit) capsule      Turmeric 500 MG CAPS Take by mouth daily      b complex vitamins capsule Take 1 capsule by mouth daily      vitamin B-12 (CYANOCOBALAMIN) 1000 MCG tablet Take 1,000 mcg by mouth daily      ELDERBERRY PO Take by mouth      loratadine (CLARITIN) 10 MG tablet Take 1 tablet by mouth daily 30 tablet 0    EPINEPHrine (EPIPEN 2-MELANIE) 0.3 MG/0.3ML SOAJ injection Inject 0.3 mLs into the skin once for 1 dose Use as directed for allergic reaction 1 each 3     No current facility-administered medications for this visit. Review of Systems     REVIEW OF SYSTEMS    See HPI Above    PhysicalExam     Vitals:    12/02/22 1141   BP: 125/76   Pulse: (!) 102   Temp: 97.7 °F (36.5 °C)   TempSrc: Temporal   SpO2: 98%   Weight: 239 lb (108.4 kg)   Height: 5' 1\" (1.549 m)       PHYSICAL EXAM  /76   Pulse (!) 102   Temp 97.7 °F (36.5 °C) (Temporal)   Ht 5' 1\" (1.549 m)   Wt 239 lb (108.4 kg)   SpO2 98%   BMI 45.16 kg/m²     GENERAL: No acute distress, alert and oriented  EYES: EOMI, Anti-icteric  NOSE: On anterior rhinoscopy there is no epistaxis, nasal mucosa moist and normal appearing, no purulent drainage. Bilateral inferior turbinates hypertrophied. EARS: Normal external appearance; on portable otomicroscopy:     -Ad: External auditory canal without stenosis, tympanic membrane clear, no middle ear effusions or retractions.      -As: External auditory canal without stenosis, tympanic membrane clear, no middle ear effusions or retractions.    Pneumatic otoscopy: Bilateral tympanic membranes mobile pneumatic otoscopy  FACE: HB 1/6 bilaterally, symmetric appearing, sensation equal bilaterally  ORAL CAVITY: No masses or lesions visualized or palpated, uvula is midline, moist mucous membranes, no oropharyngeal masses or oropharyngeal obstruction. Tonsils 3+ bilaterally. NECK: Normal range of motion, no thyromegaly, trachea is midline, no palpable lymphadenopathy or neck masses, no crepitus  NEURO: Cranial Nerves 2, 3, 4, 5, 6, 7, 11, 12 grossly intact bilaterally     I have performed a head and neck physical exam personally or was physically present during the key or critical portions of the service. Assessment and Plan     1. Laryngopharyngeal reflux (LPR)  -Take 40 mg Omeprazole (Prilosec) in morning DAILY 1 hour before breakfast.  -Discussed conservative management lifestyle modification strategies for reflux treatment including:      -Avoidance of late night eating and lying down soon after eating. Consider lying down and sleeping in a reclined position as to reduce the gravity effects of acid reflux.        -Avoidance of trigger foods that could worsen reflux including alcohol, excessively salty foods, spicy foods, acidic foods, chocolate, peppermint, fatty foods, coffee. 2. Allergic rhinitis, unspecified seasonality, unspecified trigger  3. Hypertrophy of both inferior nasal turbinates  -Start Flonase DAILY  -Continue as needed antihistamine    4. LANE (obstructive sleep apnea)  -Reiterated what pulmonology told her yesterday about weight loss. Recommend healthy diet and exercise. Follow Up     Return in about 3 months (around 3/2/2023). Dr. Alma Aguilar, [place] [id number]  Department of Otolaryngology/Head & Neck Surgery  12/2/22    Medical Decision Making:   The following items were considered in medical decision making:  Independent review of images  Review / order clinical lab tests  Review / order radiology tests  Decision to obtain old records    This note was generated completely or in part utilizing Dragon dictation speech recognition software. Occasionally, words are mistranscribed and despite editing, the text may contain inaccuracies due to incorrect word recognition. If further clarification is needed please contact the office at [phone].

## 2022-12-03 LAB — IGE: 356 IU/ML

## 2022-12-07 ENCOUNTER — TELEPHONE (OUTPATIENT)
Dept: PULMONOLOGY | Age: 62
End: 2022-12-07

## 2022-12-07 NOTE — TELEPHONE ENCOUNTER
Patient called and was returning call about blood work results.     [facility]: [phone]

## 2023-01-16 ENCOUNTER — HOSPITAL ENCOUNTER (OUTPATIENT)
Dept: PULMONOLOGY | Age: 63
Discharge: HOME | End: 2023-01-16

## 2023-01-16 LAB
CATARACTS: POSITIVE
DIABETIC RETINOPATHY: NEGATIVE
GLAUCOMA: NEGATIVE
INTRAOCULAR PRESSURE EYE: 20
INTRAOCULAR PRESSURE EYE: 20
VISUAL ACUITY DISTANCE LEFT EYE: NORMAL
VISUAL ACUITY DISTANCE RIGHT EYE: NORMAL

## 2023-01-16 RX ORDER — ALBUTEROL SULFATE 90 UG/1
4 AEROSOL, METERED RESPIRATORY (INHALATION) ONCE
Status: CANCELLED | OUTPATIENT
Start: 2023-01-16

## 2023-01-16 RX ORDER — SODIUM CHLORIDE FOR INHALATION 0.9 %
3 VIAL, NEBULIZER (ML) INHALATION ONCE
Status: CANCELLED | OUTPATIENT
Start: 2023-01-16

## 2023-01-16 RX ORDER — ALBUTEROL SULFATE 2.5 MG/3ML
2.5 SOLUTION RESPIRATORY (INHALATION) ONCE
Status: CANCELLED | OUTPATIENT
Start: 2023-01-16

## 2023-01-18 ENCOUNTER — OFFICE VISIT (OUTPATIENT)
Dept: PRIMARY CARE CLINIC | Age: 63
End: 2023-01-18
Payer: COMMERCIAL

## 2023-01-18 VITALS
BODY MASS INDEX: 44.97 KG/M2 | HEART RATE: 55 BPM | WEIGHT: 238 LBS | DIASTOLIC BLOOD PRESSURE: 86 MMHG | SYSTOLIC BLOOD PRESSURE: 144 MMHG | OXYGEN SATURATION: 98 %

## 2023-01-18 DIAGNOSIS — E11.9 TYPE 2 DIABETES MELLITUS WITHOUT COMPLICATION, WITHOUT LONG-TERM CURRENT USE OF INSULIN (HCC): Primary | ICD-10-CM

## 2023-01-18 DIAGNOSIS — I10 PRIMARY HYPERTENSION: ICD-10-CM

## 2023-01-18 DIAGNOSIS — E78.2 MIXED HYPERLIPIDEMIA: ICD-10-CM

## 2023-01-18 LAB — HBA1C MFR BLD: 7.2 %

## 2023-01-18 PROCEDURE — 3051F HG A1C>EQUAL 7.0%<8.0%: CPT | Performed by: INTERNAL MEDICINE

## 2023-01-18 PROCEDURE — 3074F SYST BP LT 130 MM HG: CPT | Performed by: INTERNAL MEDICINE

## 2023-01-18 PROCEDURE — 99214 OFFICE O/P EST MOD 30 MIN: CPT | Performed by: INTERNAL MEDICINE

## 2023-01-18 PROCEDURE — 3078F DIAST BP <80 MM HG: CPT | Performed by: INTERNAL MEDICINE

## 2023-01-18 PROCEDURE — 83036 HEMOGLOBIN GLYCOSYLATED A1C: CPT | Performed by: INTERNAL MEDICINE

## 2023-01-18 RX ORDER — LOSARTAN POTASSIUM AND HYDROCHLOROTHIAZIDE 12.5; 5 MG/1; MG/1
1 TABLET ORAL DAILY
Qty: 90 TABLET | Refills: 0 | Status: SHIPPED | OUTPATIENT
Start: 2023-01-18

## 2023-01-18 RX ORDER — ROSUVASTATIN CALCIUM 40 MG/1
40 TABLET, COATED ORAL DAILY
Qty: 90 TABLET | Refills: 1 | Status: SHIPPED | OUTPATIENT
Start: 2023-01-18

## 2023-01-18 ASSESSMENT — PATIENT HEALTH QUESTIONNAIRE - PHQ9
SUM OF ALL RESPONSES TO PHQ QUESTIONS 1-9: 0
4. FEELING TIRED OR HAVING LITTLE ENERGY: 0
SUM OF ALL RESPONSES TO PHQ9 QUESTIONS 1 & 2: 0
5. POOR APPETITE OR OVEREATING: 0
2. FEELING DOWN, DEPRESSED OR HOPELESS: 0
SUM OF ALL RESPONSES TO PHQ QUESTIONS 1-9: 0
7. TROUBLE CONCENTRATING ON THINGS, SUCH AS READING THE NEWSPAPER OR WATCHING TELEVISION: 0
8. MOVING OR SPEAKING SO SLOWLY THAT OTHER PEOPLE COULD HAVE NOTICED. OR THE OPPOSITE, BEING SO FIGETY OR RESTLESS THAT YOU HAVE BEEN MOVING AROUND A LOT MORE THAN USUAL: 0
9. THOUGHTS THAT YOU WOULD BE BETTER OFF DEAD, OR OF HURTING YOURSELF: 0
10. IF YOU CHECKED OFF ANY PROBLEMS, HOW DIFFICULT HAVE THESE PROBLEMS MADE IT FOR YOU TO DO YOUR WORK, TAKE CARE OF THINGS AT HOME, OR GET ALONG WITH OTHER PEOPLE: 0
SUM OF ALL RESPONSES TO PHQ QUESTIONS 1-9: 0
SUM OF ALL RESPONSES TO PHQ QUESTIONS 1-9: 0
3. TROUBLE FALLING OR STAYING ASLEEP: 0
6. FEELING BAD ABOUT YOURSELF - OR THAT YOU ARE A FAILURE OR HAVE LET YOURSELF OR YOUR FAMILY DOWN: 0
1. LITTLE INTEREST OR PLEASURE IN DOING THINGS: 0

## 2023-01-18 ASSESSMENT — ENCOUNTER SYMPTOMS
BLOOD IN STOOL: 0
SHORTNESS OF BREATH: 0
CHEST TIGHTNESS: 0
CONSTIPATION: 0
DIARRHEA: 0
SORE THROAT: 0
RHINORRHEA: 0
COUGH: 0
SINUS PRESSURE: 0
NAUSEA: 0
WHEEZING: 0
ABDOMINAL PAIN: 0
VOMITING: 0

## 2023-01-18 NOTE — PATIENT INSTRUCTIONS
-Limit carbohydrates to 45 grams with meals and 15 grams with snacks  -Low sodium diet  -Low fat, low cholesterol diet  -monitor blood sugars  -goal for blood sugar fasting or pre-meal  is   -goal for blood sugar 2 hours after a meal is less than 180  -goal for blood sugar at bedtime is less than 150  -Regular aerobic exercise

## 2023-01-18 NOTE — PROGRESS NOTES
Fadumo Lucero   Date ofBirth:  1960    Date of Visit:  1/18/2023    Chief Complaint   Patient presents with    Diabetes    Cholesterol Problem       HPI  Patient has Type 2 diabetes. Patient takes Metformin 500mg once daily with breakfast. Patient decreases carbohydrates. Patient states she struggles with the diet. Patient does Maria D once a week, Hip Hop dance once a week, and Kick boxing once a week. Patient monitors her blood sugar fasting and it averages 112-137. Patient has hyperlipidemia. Patient takes Rosuvastatin 40mg once daily. Patient states she has been out of Rosuvastatin for 2 months. Patient decreases fat and cholesterol. Patient has hypertension. Patient takes Losartan-HCTZ 50-12.5mg nightly. Patient's blood pressure is elevated. Patient states she had coffee with caffeine on the way to the visit. Patient decreases salt. Patient states she had the flu the week before Corinne. Review of Systems   Constitutional:  Negative for activity change, chills, fatigue, fever and unexpected weight change. HENT:  Negative for congestion, postnasal drip, rhinorrhea, sinus pressure and sore throat. Eyes:  Negative for visual disturbance. Respiratory:  Negative for cough, chest tightness, shortness of breath and wheezing. Cardiovascular:  Negative for chest pain, palpitations and leg swelling. Gastrointestinal:  Negative for abdominal pain, blood in stool, constipation, diarrhea, nausea and vomiting. Genitourinary:  Negative for dysuria, frequency, hematuria and urgency. Musculoskeletal:  Negative for arthralgias and myalgias. Skin:  Negative for rash and wound. Neurological:  Negative for dizziness, tremors, syncope, weakness, light-headedness, numbness and headaches. Psychiatric/Behavioral:  Negative for dysphoric mood and sleep disturbance. The patient is not nervous/anxious.       Allergies   Allergen Reactions    Bee Venom Swelling    Fish-Derived Products Swelling    Other Swelling     TREE NUTS    Shellfish Allergy Swelling    Tylenol [Acetaminophen]      Tylenol 3 makes pt itch     Outpatient Medications Marked as Taking for the 1/18/23 encounter (Office Visit) with Parvin Galarza MD   Medication Sig Dispense Refill    omeprazole (PRILOSEC) 40 MG delayed release capsule Take 1 capsule by mouth every morning (before breakfast) 90 capsule 1    fluticasone (FLONASE) 50 MCG/ACT nasal spray 2 sprays by Each Nostril route daily 48 g 1    losartan-hydroCHLOROthiazide (HYZAAR) 50-12.5 MG per tablet TAKE 1 TABLET BY MOUTH DAILY 90 tablet 0    EPINEPHrine (EPIPEN 2-MELANIE) 0.3 MG/0.3ML SOAJ injection Inject 0.3 mLs into the skin once for 1 dose Use as directed for allergic reaction 1 each 3    vitamin C (ASCORBIC ACID) 500 MG tablet Take 500 mg by mouth daily      Blood Glucose Monitoring Suppl (BLOOD GLUCOSE MONITOR SYSTEM) w/Device KIT Dispense glucometer covered by patient's insurance 1 kit 0    blood glucose monitor strips Test once daily 100 strip 3    Lancets MISC Use to test blood sugar once daily 100 each 3    metFORMIN (GLUCOPHAGE) 500 MG tablet TAKE 1 TABLET BY MOUTH DAILY WITH BREAKFAST 90 tablet 0    vitamin D (ERGOCALCIFEROL) 1.25 MG (04180 UT) CAPS capsule TAKE 1 CAPSULE BY MOUTH 1 TIME A WEEK 12 capsule 0    rosuvastatin (CRESTOR) 10 MG tablet Take 4 tablets by mouth daily 90 tablet 3    DANDELION ROOT PO Take by mouth      MAGNESIUM PO Take by mouth      beclomethasone (QVAR REDIHALER) 40 MCG/ACT AERB inhaler Inhale 1 puff into the lungs in the morning and 1 puff in the evening.  10.6 g 1    albuterol sulfate HFA (PROVENTIL HFA) 108 (90 Base) MCG/ACT inhaler Inhale 2 puffs into the lungs every 6 hours as needed for Wheezing 18 g 1    Turmeric 500 MG CAPS Take by mouth daily      b complex vitamins capsule Take 1 capsule by mouth daily      vitamin B-12 (CYANOCOBALAMIN) 1000 MCG tablet Take 1,000 mcg by mouth daily      ELDERBERRY PO Take by mouth loratadine (CLARITIN) 10 MG tablet Take 1 tablet by mouth daily 30 tablet 0         Vitals:    01/18/23 0951 01/18/23 0952   BP: (!) 142/88 (!) 144/86   Pulse: 55    SpO2: 98%    Weight: 238 lb (108 kg)      Body mass index is 44.97 kg/m². Physical Exam  Nursing note reviewed. Constitutional:       General: She is not in acute distress. Appearance: Normal appearance. She is well-developed. Eyes:      General: Lids are normal.      Extraocular Movements: Extraocular movements intact. Conjunctiva/sclera: Conjunctivae normal.      Pupils: Pupils are equal, round, and reactive to light. Neck:      Thyroid: No thyromegaly. Vascular: No carotid bruit. Cardiovascular:      Rate and Rhythm: Normal rate and regular rhythm. Heart sounds: Normal heart sounds, S1 normal and S2 normal. No murmur heard. No friction rub. No gallop. Pulmonary:      Effort: Pulmonary effort is normal. No respiratory distress. Breath sounds: Normal breath sounds. No wheezing, rhonchi or rales. Abdominal:      General: Bowel sounds are normal. There is no distension. Palpations: Abdomen is soft. Tenderness: There is no abdominal tenderness. Musculoskeletal:      Cervical back: Neck supple. Right lower leg: No edema. Left lower leg: No edema. Lymphadenopathy:      Head:      Right side of head: No submandibular adenopathy. Left side of head: No submandibular adenopathy. Neurological:      Mental Status: She is alert and oriented to person, place, and time.    Psychiatric:         Mood and Affect: Mood normal.         Results for POC orders placed in visit on 01/18/23   POCT glycosylated hemoglobin (Hb A1C)   Result Value Ref Range    Hemoglobin A1C 7.2 %     Lab Review   Hospital Outpatient Visit on 01/16/2023   Component Date Value    Visual Acuity Distance R* 01/16/2023 20/30     Intraocular Pressure Eye 01/16/2023 20     Visual Acuity Distance L* 01/16/2023 20/40 Intraocular Pressure Eye 01/16/2023 20     Diabetic Retinopathy 01/16/2023 NEGATIVE     Cataracts 01/16/2023 POSITIVE     Glaucoma 01/16/2023 NEGATIVE    Hospital Outpatient Visit on 12/01/2022   Component Date Value    Alternaria Alternata 12/01/2022 <0.10     Maple/Boxelder Tree 12/01/2022 <0.10     Cat Dander Antibody 12/01/2022 3.05 (A)     [place] 12/01/2022 <0.10     [place] Tree 12/01/2022 <0.10     ALLERGEN PIGWEED ROUGH I* 12/01/2022 <0.10     Russian Thistle 12/01/2022 <0.10     [place] Grass 12/01/2022 <0.10     Allergen Hormodendrum IgE 12/01/2022 <0.10     Elm Tree 12/01/2022 <0.10     [place] Tree IgE 12/01/2022 <0.10     Allergen Birch IgE 12/01/2022 <0.10     Aspergillus Fumigatus 12/01/2022 <0.10     D. pteronyssinus 12/01/2022 1.59 (A)     D. Farinae 12/01/2022 3.81 (A)     Berm[place] Grass IgE 12/01/2022 <0.10     Allergen, Tree, White As* 12/01/2022 <0.10     P.  Notatum 12/01/2022 <0.10     Short Ragwd(A nery.) * 12/01/2022 0.22     Cockroach IgE 12/01/2022 0.19     Allergen Tree [place] 12/01/2022 <0.10     [place] Tree IgE 12/01/2022 <0.10     Pecan Tree IgE 12/01/2022 <0.10     Allergen Mouse Epithelial 12/01/2022 <0.10     Mucor Racemosus 12/01/2022 <0.10     Allergen White Markesan * 12/01/2022 <0.10     Dog Dander IgE 12/01/2022 2.14 (A)     Sheep Sorrel IgE 12/01/2022 <0.10     IgE 12/01/2022 356 (A)    Hospital Outpatient Visit on 10/28/2022   Component Date Value    Left Ventricular Ejectio* 10/28/2022 58     LVEF MODALITY 10/28/2022 ECHO    Hospital Outpatient Visit on 10/28/2022   Component Date Value    Left Ventricular Ejectio* 10/28/2022 72     LVEF MODALITY 10/28/2022 Nuclear    Office Visit on 10/18/2022   Component Date Value    Microalbumin, Random Uri* 10/18/2022 3.40 (A)     Creatinine, Ur 10/18/2022 360.9 (A)     Microalbumin Creatinine * 10/18/2022 9.4    Hospital Outpatient Visit on 10/01/2022   Component Date Value    Vit D, 25-Hydroxy 10/01/2022 25.0 (A) TSH 10/01/2022 0.88     Cholesterol, Total 10/01/2022 213 (A)     Triglycerides 10/01/2022 87     HDL 10/01/2022 66 (A)     LDL Calculated 10/01/2022 130 (A)     VLDL Cholesterol Calcula* 10/01/2022 17     Hemoglobin A1C 10/01/2022 7.5     eAG 10/01/2022 168.6     Sodium 10/01/2022 136     Potassium 10/01/2022 4.0     Chloride 10/01/2022 100     CO2 10/01/2022 27     Anion Gap 10/01/2022 9     Glucose 10/01/2022 120 (A)     BUN 10/01/2022 10     Creatinine 10/01/2022 0.8     GFR Non- 10/01/2022 >60     GFR  10/01/2022 >60     Calcium 10/01/2022 9.2     Total Protein 10/01/2022 7.2     Albumin 10/01/2022 4.3     Albumin/Globulin Ratio 10/01/2022 1.5     Total Bilirubin 10/01/2022 0.3     Alkaline Phosphatase 10/01/2022 108     ALT 10/01/2022 25     AST 10/01/2022 22     WBC 10/01/2022 7.3     RBC 10/01/2022 4.71     Hemoglobin 10/01/2022 12.6     Hematocrit 10/01/2022 39.4     MCV 10/01/2022 83.6     MCH 10/01/2022 26.8     MCHC 10/01/2022 32.0     RDW 10/01/2022 14.4     Platelets 24/25/0023 346     MPV 10/01/2022 8.1     Neutrophils % 10/01/2022 59.9     Lymphocytes % 10/01/2022 26.0     Monocytes % 10/01/2022 11.2     Eosinophils % 10/01/2022 2.0     Basophils % 10/01/2022 0.9     Neutrophils Absolute 10/01/2022 4.2     Lymphocytes Absolute 10/01/2022 1.8     Monocytes Absolute 10/01/2022 0.8     Eosinophils Absolute 10/01/2022 0.1     Basophils Absolute 10/01/2022 0.1          Assessment/Plan     1. Type 2 diabetes mellitus without complication, without long-term current use of insulin (HCC)  - Hemoglobin A1c of 7.2% shows improvement but diabetes needs better control  -Increase metFORMIN (GLUCOPHAGE) 500 MG tablet; Take 1 tablet by mouth 2 times daily (with meals)  Dispense: 180 tablet;  Refill: 1  -Limit carbohydrates to 45 grams with meals and 15 grams with snacks  -monitor blood sugars  -goal for blood sugar fasting or pre-meal  is   -goal for blood sugar 2 hours after a meal is less than 180  -goal for blood sugar at bedtime is less than 150  -Regular aerobic exercise  -POCT glycosylated hemoglobin (Hb A1C)    2. Primary hypertension  - blood pressure is elevated today  - patient states she had coffee with caffeine on the way to visit  - blood pressure had been ok prior to today  - Continue losartan-hydroCHLOROthiazide (HYZAAR) 50-12.5 MG per tablet; Take 1 tablet by mouth daily  Dispense: 90 tablet; Refill: 0  -Low sodium diet  -Regular aerobic exercise    3. Mixed hyperlipidemia  - LDL is not at goal  -patient states she has been out of Rosuvastatin for 2 months  - Resume rosuvastatin (CRESTOR) 40 MG tablet; Take 1 tablet by mouth daily  Dispense: 90 tablet; Refill: 1  -Low fat, low cholesterol diet  -Regular aerobic exercise      Discussed medications with patient, who voiced understanding of their use and indications. All questions answered.       Return in about 3 months (around 4/18/2023) for annual physical exam.

## 2023-04-27 ENCOUNTER — OFFICE VISIT (OUTPATIENT)
Dept: PRIMARY CARE CLINIC | Age: 63
End: 2023-04-27
Payer: COMMERCIAL

## 2023-04-27 VITALS
DIASTOLIC BLOOD PRESSURE: 82 MMHG | TEMPERATURE: 97.5 F | BODY MASS INDEX: 44.89 KG/M2 | HEART RATE: 95 BPM | SYSTOLIC BLOOD PRESSURE: 138 MMHG | WEIGHT: 237.8 LBS | OXYGEN SATURATION: 98 % | HEIGHT: 61 IN

## 2023-04-27 DIAGNOSIS — I10 PRIMARY HYPERTENSION: ICD-10-CM

## 2023-04-27 DIAGNOSIS — E11.9 TYPE 2 DIABETES MELLITUS WITHOUT COMPLICATION, WITHOUT LONG-TERM CURRENT USE OF INSULIN (HCC): Primary | ICD-10-CM

## 2023-04-27 DIAGNOSIS — R25.2 MUSCLE CRAMPS: ICD-10-CM

## 2023-04-27 DIAGNOSIS — M25.511 RIGHT SHOULDER PAIN, UNSPECIFIED CHRONICITY: ICD-10-CM

## 2023-04-27 DIAGNOSIS — E66.01 OBESITY, CLASS III, BMI 40-49.9 (MORBID OBESITY) (HCC): ICD-10-CM

## 2023-04-27 DIAGNOSIS — E78.2 MIXED HYPERLIPIDEMIA: ICD-10-CM

## 2023-04-27 DIAGNOSIS — E11.9 TYPE 2 DIABETES MELLITUS WITHOUT COMPLICATION, WITHOUT LONG-TERM CURRENT USE OF INSULIN (HCC): ICD-10-CM

## 2023-04-27 LAB
ALBUMIN SERPL-MCNC: 3.9 G/DL (ref 3.4–5)
ALBUMIN/GLOB SERPL: 1.6 {RATIO} (ref 1.1–2.2)
ALP SERPL-CCNC: 101 U/L (ref 40–129)
ALT SERPL-CCNC: 21 U/L (ref 10–40)
ANION GAP SERPL CALCULATED.3IONS-SCNC: 11 MMOL/L (ref 3–16)
AST SERPL-CCNC: 18 U/L (ref 15–37)
BILIRUB SERPL-MCNC: <0.2 MG/DL (ref 0–1)
BUN SERPL-MCNC: 10 MG/DL (ref 7–20)
CALCIUM SERPL-MCNC: 9.3 MG/DL (ref 8.3–10.6)
CHLORIDE SERPL-SCNC: 103 MMOL/L (ref 99–110)
CHOLEST SERPL-MCNC: 196 MG/DL (ref 0–199)
CK SERPL-CCNC: 185 U/L (ref 26–192)
CO2 SERPL-SCNC: 26 MMOL/L (ref 21–32)
CREAT SERPL-MCNC: 0.9 MG/DL (ref 0.6–1.2)
GFR SERPLBLD CREATININE-BSD FMLA CKD-EPI: >60 ML/MIN/{1.73_M2}
GLUCOSE SERPL-MCNC: 106 MG/DL (ref 70–99)
HBA1C MFR BLD: 7.6 %
HDLC SERPL-MCNC: 65 MG/DL (ref 40–60)
LDLC SERPL CALC-MCNC: 115 MG/DL
POTASSIUM SERPL-SCNC: 4 MMOL/L (ref 3.5–5.1)
PROT SERPL-MCNC: 6.4 G/DL (ref 6.4–8.2)
SODIUM SERPL-SCNC: 140 MMOL/L (ref 136–145)
TRIGL SERPL-MCNC: 80 MG/DL (ref 0–150)
VLDLC SERPL CALC-MCNC: 16 MG/DL

## 2023-04-27 PROCEDURE — 3074F SYST BP LT 130 MM HG: CPT | Performed by: INTERNAL MEDICINE

## 2023-04-27 PROCEDURE — 3051F HG A1C>EQUAL 7.0%<8.0%: CPT | Performed by: INTERNAL MEDICINE

## 2023-04-27 PROCEDURE — 3078F DIAST BP <80 MM HG: CPT | Performed by: INTERNAL MEDICINE

## 2023-04-27 PROCEDURE — 99214 OFFICE O/P EST MOD 30 MIN: CPT | Performed by: INTERNAL MEDICINE

## 2023-04-27 PROCEDURE — 83036 HEMOGLOBIN GLYCOSYLATED A1C: CPT | Performed by: INTERNAL MEDICINE

## 2023-04-27 RX ORDER — SEMAGLUTIDE 0.68 MG/ML
0.25 INJECTION, SOLUTION SUBCUTANEOUS WEEKLY
Qty: 3 ML | Refills: 1 | Status: SHIPPED | OUTPATIENT
Start: 2023-04-27

## 2023-04-27 RX ORDER — LOSARTAN POTASSIUM AND HYDROCHLOROTHIAZIDE 25; 100 MG/1; MG/1
1 TABLET ORAL DAILY
Qty: 90 TABLET | Refills: 1 | Status: SHIPPED | OUTPATIENT
Start: 2023-04-27

## 2023-04-27 RX ORDER — LOSARTAN POTASSIUM AND HYDROCHLOROTHIAZIDE 12.5; 5 MG/1; MG/1
1 TABLET ORAL DAILY
Qty: 90 TABLET | Refills: 0 | Status: CANCELLED | OUTPATIENT
Start: 2023-04-27

## 2023-04-27 SDOH — ECONOMIC STABILITY: INCOME INSECURITY: HOW HARD IS IT FOR YOU TO PAY FOR THE VERY BASICS LIKE FOOD, HOUSING, MEDICAL CARE, AND HEATING?: NOT HARD AT ALL

## 2023-04-27 SDOH — ECONOMIC STABILITY: FOOD INSECURITY: WITHIN THE PAST 12 MONTHS, THE FOOD YOU BOUGHT JUST DIDN'T LAST AND YOU DIDN'T HAVE MONEY TO GET MORE.: NEVER TRUE

## 2023-04-27 SDOH — ECONOMIC STABILITY: FOOD INSECURITY: WITHIN THE PAST 12 MONTHS, YOU WORRIED THAT YOUR FOOD WOULD RUN OUT BEFORE YOU GOT MONEY TO BUY MORE.: NEVER TRUE

## 2023-04-27 SDOH — ECONOMIC STABILITY: HOUSING INSECURITY
IN THE LAST 12 MONTHS, WAS THERE A TIME WHEN YOU DID NOT HAVE A STEADY PLACE TO SLEEP OR SLEPT IN A SHELTER (INCLUDING NOW)?: NO

## 2023-04-28 LAB — MAGNESIUM UR-MCNC: 6.5 MG/DL (ref 4.1–13.8)

## 2023-05-04 ENCOUNTER — TELEPHONE (OUTPATIENT)
Dept: PRIMARY CARE CLINIC | Age: 63
End: 2023-05-04

## 2023-05-04 NOTE — TELEPHONE ENCOUNTER
----- Message from Nelida Hughes sent at 5/4/2023 12:26 PM EDT -----  Subject: Appointment Request    Reason for Call: Established Patient Appointment needed: Routine Pre-Op    QUESTIONS    Reason for appointment request? Available appointments did not meet   patient need     Additional Information for Provider? pt needs a pre-op for her knee   surgery she is having on June 6th.   ---------------------------------------------------------------------------  --------------  Theodore PALENCIA  [phone]; OK to leave message on voicemail  ---------------------------------------------------------------------------  --------------  SCRIPT ANSWERS  COVID Screen: Emilio Magdaleno

## 2023-05-13 PROBLEM — E66.813 OBESITY, CLASS III, BMI 40-49.9 (MORBID OBESITY) (HCC): Status: ACTIVE | Noted: 2023-05-13

## 2023-05-13 PROBLEM — R25.2 MUSCLE CRAMPS: Status: ACTIVE | Noted: 2023-05-13

## 2023-05-13 PROBLEM — M25.511 RIGHT SHOULDER PAIN: Status: ACTIVE | Noted: 2023-05-13

## 2023-05-13 PROBLEM — E66.01 OBESITY, CLASS III, BMI 40-49.9 (MORBID OBESITY) (HCC): Status: ACTIVE | Noted: 2023-05-13

## 2023-05-13 ASSESSMENT — ENCOUNTER SYMPTOMS
WHEEZING: 0
BLOOD IN STOOL: 0
DIARRHEA: 0
VOMITING: 0
CONSTIPATION: 0
SHORTNESS OF BREATH: 0
NAUSEA: 0
CHEST TIGHTNESS: 0
RHINORRHEA: 0
SORE THROAT: 0
COUGH: 0
ABDOMINAL PAIN: 0
SINUS PRESSURE: 0

## 2023-05-17 ENCOUNTER — OFFICE VISIT (OUTPATIENT)
Dept: PRIMARY CARE CLINIC | Age: 63
End: 2023-05-17

## 2023-05-17 VITALS
HEART RATE: 76 BPM | SYSTOLIC BLOOD PRESSURE: 118 MMHG | TEMPERATURE: 97.4 F | HEIGHT: 61 IN | DIASTOLIC BLOOD PRESSURE: 80 MMHG | OXYGEN SATURATION: 98 % | WEIGHT: 233 LBS | BODY MASS INDEX: 43.99 KG/M2

## 2023-05-17 DIAGNOSIS — E66.01 OBESITY, CLASS III, BMI 40-49.9 (MORBID OBESITY) (HCC): ICD-10-CM

## 2023-05-17 DIAGNOSIS — I10 PRIMARY HYPERTENSION: ICD-10-CM

## 2023-05-17 DIAGNOSIS — Z01.818 PRE-OP EVALUATION: Primary | ICD-10-CM

## 2023-05-17 DIAGNOSIS — E78.2 MIXED HYPERLIPIDEMIA: ICD-10-CM

## 2023-05-17 DIAGNOSIS — E11.9 TYPE 2 DIABETES MELLITUS WITHOUT COMPLICATION, WITHOUT LONG-TERM CURRENT USE OF INSULIN (HCC): ICD-10-CM

## 2023-05-17 DIAGNOSIS — G47.33 OSA (OBSTRUCTIVE SLEEP APNEA): ICD-10-CM

## 2023-05-17 DIAGNOSIS — Z01.818 PRE-OP EVALUATION: ICD-10-CM

## 2023-05-17 DIAGNOSIS — S83.231D COMPLEX TEAR OF MEDIAL MENISCUS OF RIGHT KNEE AS CURRENT INJURY, SUBSEQUENT ENCOUNTER: ICD-10-CM

## 2023-05-17 PROBLEM — J06.9 UPPER RESPIRATORY TRACT INFECTION: Status: RESOLVED | Noted: 2022-10-18 | Resolved: 2023-05-17

## 2023-05-17 LAB
DEPRECATED RDW RBC AUTO: 14.4 % (ref 12.4–15.4)
HCT VFR BLD AUTO: 37.5 % (ref 36–48)
HGB BLD-MCNC: 12.2 G/DL (ref 12–16)
MCH RBC QN AUTO: 26.8 PG (ref 26–34)
MCHC RBC AUTO-ENTMCNC: 32.5 G/DL (ref 31–36)
MCV RBC AUTO: 82.4 FL (ref 80–100)
PLATELET # BLD AUTO: 387 K/UL (ref 135–450)
PMV BLD AUTO: 8.1 FL (ref 5–10.5)
RBC # BLD AUTO: 4.55 M/UL (ref 4–5.2)
WBC # BLD AUTO: 7.3 K/UL (ref 4–11)

## 2023-05-17 ASSESSMENT — ENCOUNTER SYMPTOMS
DIARRHEA: 0
COUGH: 0
WHEEZING: 0
SHORTNESS OF BREATH: 0
NAUSEA: 0
VOMITING: 0

## 2023-05-18 LAB
ANION GAP SERPL CALCULATED.3IONS-SCNC: 12 MMOL/L (ref 3–16)
BUN SERPL-MCNC: 14 MG/DL (ref 7–20)
CALCIUM SERPL-MCNC: 9.3 MG/DL (ref 8.3–10.6)
CHLORIDE SERPL-SCNC: 101 MMOL/L (ref 99–110)
CO2 SERPL-SCNC: 26 MMOL/L (ref 21–32)
CREAT SERPL-MCNC: 0.9 MG/DL (ref 0.6–1.2)
GFR SERPLBLD CREATININE-BSD FMLA CKD-EPI: >60 ML/MIN/{1.73_M2}
GLUCOSE SERPL-MCNC: 94 MG/DL (ref 70–99)
POTASSIUM SERPL-SCNC: 3.8 MMOL/L (ref 3.5–5.1)
SODIUM SERPL-SCNC: 139 MMOL/L (ref 136–145)

## 2023-07-14 ENCOUNTER — HOSPITAL ENCOUNTER (OUTPATIENT)
Dept: WOMENS IMAGING | Age: 63
Discharge: HOME | End: 2023-07-14
Payer: COMMERCIAL

## 2023-07-14 VITALS — BODY MASS INDEX: 43.23 KG/M2 | HEIGHT: 61 IN | WEIGHT: 229 LBS

## 2023-07-14 DIAGNOSIS — Z13.9 ENCOUNTER FOR SCREENING: ICD-10-CM

## 2023-07-14 PROCEDURE — 77063 BREAST TOMOSYNTHESIS BI: CPT

## 2023-08-08 ENCOUNTER — HOSPITAL ENCOUNTER (OUTPATIENT)
Dept: GENERAL RADIOLOGY | Age: 63
Discharge: HOME | End: 2023-08-08
Payer: COMMERCIAL

## 2023-08-08 ENCOUNTER — OFFICE VISIT (OUTPATIENT)
Dept: PRIMARY CARE CLINIC | Age: 63
End: 2023-08-08
Payer: COMMERCIAL

## 2023-08-08 VITALS
SYSTOLIC BLOOD PRESSURE: 128 MMHG | DIASTOLIC BLOOD PRESSURE: 86 MMHG | OXYGEN SATURATION: 95 % | HEART RATE: 95 BPM | WEIGHT: 231.6 LBS | BODY MASS INDEX: 43.76 KG/M2

## 2023-08-08 DIAGNOSIS — M25.511 RIGHT SHOULDER PAIN, UNSPECIFIED CHRONICITY: ICD-10-CM

## 2023-08-08 DIAGNOSIS — R20.0 RIGHT ARM NUMBNESS: ICD-10-CM

## 2023-08-08 DIAGNOSIS — E55.9 VITAMIN D DEFICIENCY: ICD-10-CM

## 2023-08-08 DIAGNOSIS — M54.2 NECK PAIN: Primary | ICD-10-CM

## 2023-08-08 DIAGNOSIS — E11.9 TYPE 2 DIABETES MELLITUS WITHOUT COMPLICATION, WITHOUT LONG-TERM CURRENT USE OF INSULIN (HCC): Primary | ICD-10-CM

## 2023-08-08 DIAGNOSIS — E78.2 MIXED HYPERLIPIDEMIA: ICD-10-CM

## 2023-08-08 DIAGNOSIS — R22.2 LOCALIZED SWELLING OF CHEST WALL: ICD-10-CM

## 2023-08-08 DIAGNOSIS — R22.2 NODULE OF RIGHT ANTERIOR CHEST WALL: ICD-10-CM

## 2023-08-08 PROCEDURE — 3078F DIAST BP <80 MM HG: CPT | Performed by: INTERNAL MEDICINE

## 2023-08-08 PROCEDURE — 99214 OFFICE O/P EST MOD 30 MIN: CPT | Performed by: INTERNAL MEDICINE

## 2023-08-08 PROCEDURE — 3074F SYST BP LT 130 MM HG: CPT | Performed by: INTERNAL MEDICINE

## 2023-08-08 PROCEDURE — 73030 X-RAY EXAM OF SHOULDER: CPT

## 2023-08-08 RX ORDER — NAPROXEN 500 MG/1
500 TABLET ORAL 2 TIMES DAILY WITH MEALS
Qty: 60 TABLET | Refills: 0 | Status: SHIPPED | OUTPATIENT
Start: 2023-08-08

## 2023-08-08 ASSESSMENT — ENCOUNTER SYMPTOMS
ABDOMINAL PAIN: 0
NAUSEA: 0
CHEST TIGHTNESS: 0
BLOOD IN STOOL: 0
VOMITING: 0
WHEEZING: 0
SHORTNESS OF BREATH: 0
SINUS PRESSURE: 0
DIARRHEA: 0
RHINORRHEA: 0
COUGH: 0
SORE THROAT: 0
CONSTIPATION: 0

## 2023-08-10 ENCOUNTER — HOSPITAL ENCOUNTER (OUTPATIENT)
Dept: ULTRASOUND IMAGING | Age: 63
Discharge: HOME | End: 2023-08-10
Payer: COMMERCIAL

## 2023-08-10 DIAGNOSIS — R22.2 LOCALIZED SWELLING OF CHEST WALL: ICD-10-CM

## 2023-08-10 PROCEDURE — 76999 ECHO EXAMINATION PROCEDURE: CPT

## 2023-08-15 DIAGNOSIS — E11.9 TYPE 2 DIABETES MELLITUS WITHOUT COMPLICATION, WITHOUT LONG-TERM CURRENT USE OF INSULIN (HCC): ICD-10-CM

## 2023-08-15 RX ORDER — SEMAGLUTIDE 0.68 MG/ML
0.5 INJECTION, SOLUTION SUBCUTANEOUS WEEKLY
Qty: 3 ML | Refills: 1 | Status: SHIPPED | OUTPATIENT
Start: 2023-08-15

## 2023-08-15 NOTE — TELEPHONE ENCOUNTER
Medication:   Requested Prescriptions     Pending Prescriptions Disp Refills    OZEMPIC, 0.25 OR 0.5 MG/DOSE, 2 MG/3ML SOPN [Pharmacy Med Name: OZEMPIC 0.25 OR 0.5MG/[id number] 3ML] 3 mL 1     Sig: INJECT 0.25 MG UNDER THE SKIN ONCE A WEEK     Last Filled:  4.27.23    Last appt: 8/8/2023   Next appt: Visit date not found    Last OARRS: No flowsheet data found.

## 2023-11-19 DIAGNOSIS — R06.02 SHORTNESS OF BREATH: ICD-10-CM

## 2023-11-20 RX ORDER — ALBUTEROL SULFATE 90 UG/1
AEROSOL, METERED RESPIRATORY (INHALATION)
Qty: 18 G | Refills: 1 | Status: SHIPPED | OUTPATIENT
Start: 2023-11-20

## 2023-11-20 NOTE — TELEPHONE ENCOUNTER
Medication:   Requested Prescriptions     Pending Prescriptions Disp Refills    albuterol sulfate HFA (PROVENTIL;VENTOLIN;PROAIR) 108 (90 Base) MCG/ACT inhaler [Pharmacy Med Name: ALBUTEROL HFA INH(200 PUFFS) 18GM] 18 g 1     Sig: INHALE 2 PUFFS INTO THE LUNGS Q6H AS NEEDED FOR WHEEZING     Last Filled:  7/11/2022    Last appt: 8/8/2023   Next appt: Visit date not found    Last OARRS:        No data to display

## 2023-12-22 DIAGNOSIS — E11.9 TYPE 2 DIABETES MELLITUS WITHOUT COMPLICATION, WITHOUT LONG-TERM CURRENT USE OF INSULIN (HCC): ICD-10-CM

## 2023-12-22 NOTE — TELEPHONE ENCOUNTER
Medication:   Requested Prescriptions     Pending Prescriptions Disp Refills    OZEMPIC, 0.25 OR 0.5 MG/DOSE, 2 MG/3ML SOPN [Pharmacy Med Name: OZEMPIC 0.25 OR 0.5MG/[id number] 3ML] 3 mL 1     Sig: INJECT 0.5 MG INTO THE SKIN ONCE A WEEK     Last Filled:  8.15.23    Last appt: 5.17.23   Next appt: Visit date not found    Last Labs DM:   Lab Results   Component Value Date/Time    LABA1C 7.6 04/27/2023 09:15 AM

## 2023-12-23 RX ORDER — SEMAGLUTIDE 0.68 MG/ML
0.5 INJECTION, SOLUTION SUBCUTANEOUS WEEKLY
Qty: 3 ML | Refills: 0 | Status: SHIPPED | OUTPATIENT
Start: 2023-12-23

## 2023-12-23 NOTE — TELEPHONE ENCOUNTER
Call patient to schedule appointment for her diabetes, hypertension, and hyperlipidemia. She was last seen for these conditions on 5/17/2023.

## 2024-01-08 ENCOUNTER — OFFICE VISIT (OUTPATIENT)
Dept: PRIMARY CARE CLINIC | Age: 64
End: 2024-01-08
Payer: COMMERCIAL

## 2024-01-08 VITALS
DIASTOLIC BLOOD PRESSURE: 76 MMHG | WEIGHT: 236 LBS | OXYGEN SATURATION: 96 % | BODY MASS INDEX: 44.59 KG/M2 | HEART RATE: 90 BPM | SYSTOLIC BLOOD PRESSURE: 104 MMHG

## 2024-01-08 DIAGNOSIS — E78.2 MIXED HYPERLIPIDEMIA: ICD-10-CM

## 2024-01-08 DIAGNOSIS — R22.2 NODULE OF RIGHT ANTERIOR CHEST WALL: ICD-10-CM

## 2024-01-08 DIAGNOSIS — E55.9 VITAMIN D DEFICIENCY: ICD-10-CM

## 2024-01-08 DIAGNOSIS — E11.9 TYPE 2 DIABETES MELLITUS WITHOUT COMPLICATION, WITHOUT LONG-TERM CURRENT USE OF INSULIN (HCC): ICD-10-CM

## 2024-01-08 DIAGNOSIS — I10 PRIMARY HYPERTENSION: ICD-10-CM

## 2024-01-08 DIAGNOSIS — R20.2 NUMBNESS AND TINGLING OF BOTH FEET: ICD-10-CM

## 2024-01-08 DIAGNOSIS — R20.0 NUMBNESS AND TINGLING OF BOTH FEET: ICD-10-CM

## 2024-01-08 DIAGNOSIS — R22.2 LOCALIZED SWELLING OF CHEST WALL: ICD-10-CM

## 2024-01-08 DIAGNOSIS — E11.9 TYPE 2 DIABETES MELLITUS WITHOUT COMPLICATION, WITHOUT LONG-TERM CURRENT USE OF INSULIN (HCC): Primary | ICD-10-CM

## 2024-01-08 LAB
25(OH)D3 SERPL-MCNC: 19.1 NG/ML
ALBUMIN SERPL-MCNC: 4.2 G/DL (ref 3.4–5)
ALBUMIN/GLOB SERPL: 1.5 {RATIO} (ref 1.1–2.2)
ALP SERPL-CCNC: 114 U/L (ref 40–129)
ALT SERPL-CCNC: 28 U/L (ref 10–40)
ANION GAP SERPL CALCULATED.3IONS-SCNC: 10 MMOL/L (ref 3–16)
AST SERPL-CCNC: 20 U/L (ref 15–37)
BILIRUB SERPL-MCNC: 0.3 MG/DL (ref 0–1)
BUN SERPL-MCNC: 12 MG/DL (ref 7–20)
CALCIUM SERPL-MCNC: 9.5 MG/DL (ref 8.3–10.6)
CHLORIDE SERPL-SCNC: 102 MMOL/L (ref 99–110)
CHOLEST SERPL-MCNC: 280 MG/DL (ref 0–199)
CO2 SERPL-SCNC: 28 MMOL/L (ref 21–32)
CREAT SERPL-MCNC: 1 MG/DL (ref 0.6–1.2)
CREAT UR-MCNC: 112.7 MG/DL (ref 28–259)
GFR SERPLBLD CREATININE-BSD FMLA CKD-EPI: >60 ML/MIN/{1.73_M2}
GLUCOSE SERPL-MCNC: 85 MG/DL (ref 70–99)
HBA1C MFR BLD: 6.7 %
HDLC SERPL-MCNC: 69 MG/DL (ref 40–60)
LDLC SERPL CALC-MCNC: 191 MG/DL
MICROALBUMIN UR DL<=1MG/L-MCNC: <1.2 MG/DL
MICROALBUMIN/CREAT UR: NORMAL MG/G (ref 0–30)
POTASSIUM SERPL-SCNC: 4 MMOL/L (ref 3.5–5.1)
PROT SERPL-MCNC: 7 G/DL (ref 6.4–8.2)
SODIUM SERPL-SCNC: 140 MMOL/L (ref 136–145)
TRIGL SERPL-MCNC: 102 MG/DL (ref 0–150)
VLDLC SERPL CALC-MCNC: 20 MG/DL

## 2024-01-08 PROCEDURE — 99214 OFFICE O/P EST MOD 30 MIN: CPT | Performed by: INTERNAL MEDICINE

## 2024-01-08 PROCEDURE — 3074F SYST BP LT 130 MM HG: CPT | Performed by: INTERNAL MEDICINE

## 2024-01-08 PROCEDURE — 83036 HEMOGLOBIN GLYCOSYLATED A1C: CPT | Performed by: INTERNAL MEDICINE

## 2024-01-08 PROCEDURE — 3078F DIAST BP <80 MM HG: CPT | Performed by: INTERNAL MEDICINE

## 2024-01-08 PROCEDURE — 3044F HG A1C LEVEL LT 7.0%: CPT | Performed by: INTERNAL MEDICINE

## 2024-01-08 RX ORDER — ATORVASTATIN CALCIUM 10 MG/1
10 TABLET, FILM COATED ORAL DAILY
Qty: 90 TABLET | OUTPATIENT
Start: 2024-01-08

## 2024-01-08 RX ORDER — ATORVASTATIN CALCIUM 10 MG/1
10 TABLET, FILM COATED ORAL DAILY
Qty: 30 TABLET | Refills: 3 | Status: SHIPPED | OUTPATIENT
Start: 2024-01-08

## 2024-01-08 ASSESSMENT — PATIENT HEALTH QUESTIONNAIRE - PHQ9
1. LITTLE INTEREST OR PLEASURE IN DOING THINGS: 0
SUM OF ALL RESPONSES TO PHQ QUESTIONS 1-9: 0
5. POOR APPETITE OR OVEREATING: 0
7. TROUBLE CONCENTRATING ON THINGS, SUCH AS READING THE NEWSPAPER OR WATCHING TELEVISION: 0
SUM OF ALL RESPONSES TO PHQ QUESTIONS 1-9: 0
9. THOUGHTS THAT YOU WOULD BE BETTER OFF DEAD, OR OF HURTING YOURSELF: 0
8. MOVING OR SPEAKING SO SLOWLY THAT OTHER PEOPLE COULD HAVE NOTICED. OR THE OPPOSITE, BEING SO FIGETY OR RESTLESS THAT YOU HAVE BEEN MOVING AROUND A LOT MORE THAN USUAL: 0
10. IF YOU CHECKED OFF ANY PROBLEMS, HOW DIFFICULT HAVE THESE PROBLEMS MADE IT FOR YOU TO DO YOUR WORK, TAKE CARE OF THINGS AT HOME, OR GET ALONG WITH OTHER PEOPLE: 0
SUM OF ALL RESPONSES TO PHQ QUESTIONS 1-9: 0
4. FEELING TIRED OR HAVING LITTLE ENERGY: 0
2. FEELING DOWN, DEPRESSED OR HOPELESS: 0
6. FEELING BAD ABOUT YOURSELF - OR THAT YOU ARE A FAILURE OR HAVE LET YOURSELF OR YOUR FAMILY DOWN: 0
SUM OF ALL RESPONSES TO PHQ QUESTIONS 1-9: 0
SUM OF ALL RESPONSES TO PHQ9 QUESTIONS 1 & 2: 0
3. TROUBLE FALLING OR STAYING ASLEEP: 0

## 2024-01-08 NOTE — PROGRESS NOTES
Allergies, Social history, Medical history, and results reviewed      An electronic signature was used to authenticate this note.    -Jewel Tom MD

## 2024-01-08 NOTE — PATIENT INSTRUCTIONS
-Low sodium diet  -Low fat, low cholesterol diet  -Limit carbohydrates to 45 grams with meals and 15 grams with snacks  -monitor blood sugars  -goal for blood sugar fasting or pre-meal  is   -goal for blood sugar 2 hours after a meal is less than 180  -goal for blood sugar at bedtime is less than 150  -Regular aerobic exercise

## 2024-01-09 LAB
EST. AVERAGE GLUCOSE BLD GHB EST-MCNC: 139.9 MG/DL
HBA1C MFR BLD: 6.5 %

## 2024-01-21 ENCOUNTER — OFFICE VISIT (OUTPATIENT)
Age: 64
End: 2024-01-21

## 2024-01-21 VITALS
BODY MASS INDEX: 43.73 KG/M2 | SYSTOLIC BLOOD PRESSURE: 133 MMHG | HEART RATE: 99 BPM | DIASTOLIC BLOOD PRESSURE: 84 MMHG | HEIGHT: 61 IN | OXYGEN SATURATION: 95 % | TEMPERATURE: 98 F | WEIGHT: 231.6 LBS

## 2024-01-21 DIAGNOSIS — J06.9 VIRAL UPPER RESPIRATORY TRACT INFECTION: Primary | ICD-10-CM

## 2024-01-21 LAB
Lab: NORMAL
QC PASS/FAIL: NORMAL
SARS-COV-2 RDRP RESP QL NAA+PROBE: NEGATIVE

## 2024-01-21 RX ORDER — BENZONATATE 200 MG/1
200 CAPSULE ORAL 3 TIMES DAILY PRN
Qty: 21 CAPSULE | Refills: 0 | Status: SHIPPED | OUTPATIENT
Start: 2024-01-21 | End: 2024-01-28

## 2024-01-29 ENCOUNTER — TELEPHONE (OUTPATIENT)
Dept: PRIMARY CARE CLINIC | Age: 64
End: 2024-01-29

## 2024-01-29 NOTE — TELEPHONE ENCOUNTER
----- Message from Keren Jones sent at 1/29/2024  8:52 AM EST -----  Subject: Medication Problem     Medication: 1mg of ozempic  Dosage: unsure   Ordering Provider: Jewel Tom    Question/Problem: Patient says she is having trouble getting this 90 days   supply of this specific medication filled at her pharmacy. Patients says   that pharmacy states the medication is on backorder. Patient says she this   prescribed to her by PCP, Jewel Tom at her appointment on 1.8.2024   but has yet to be able to get it filled due to backorder with pharmacy.   Patient wants to know what PCP would suggest as alternative or does she   know where she is able to get medication filled at, Patient says she is   open to go to any pharmacy close by if they have the medication. Please   reach out to the patient to discuss.       [id number] [place], [place] - [address]   - [place] [phone] - F [phone]    ---------------------------------------------------------------------------  --------------  CALL BACK INFO  [phone]; OK to leave message on voicemail  ---------------------------------------------------------------------------  --------------    SCRIPT ANSWERS  Relationship to Patient: Self

## 2024-02-02 ENCOUNTER — TELEPHONE (OUTPATIENT)
Dept: PRIMARY CARE CLINIC | Age: 64
End: 2024-02-02

## 2024-02-02 DIAGNOSIS — E11.9 TYPE 2 DIABETES MELLITUS WITHOUT COMPLICATION, WITHOUT LONG-TERM CURRENT USE OF INSULIN (HCC): Primary | ICD-10-CM

## 2024-02-02 NOTE — TELEPHONE ENCOUNTER
----- Message from Jazmine Anthony sent at 2/2/2024  2:19 PM EST -----  Subject: Medication Problem     Medication: Ozempic   Dosage: 1 mg  Ordering Provider: Jewel Tom    Question/Problem: [facility] does not have the 1 mg. They do however have   the .5 mg, .25 mg, 2mg. Can she have one of these instead. Please send new   order and advise Shira. Thank you       Pharmacy: [id number] - [facility] [address]   - [place] [phone] - F [phone]    ---------------------------------------------------------------------------  --------------  CALL BACK INFO  [phone]; OK to leave message on voicemail  ---------------------------------------------------------------------------  --------------    SCRIPT ANSWERS  Relationship to Patient: Self

## 2024-02-08 RX ORDER — SEMAGLUTIDE 2.68 MG/ML
2 INJECTION, SOLUTION SUBCUTANEOUS
Qty: 9 ML | Refills: 1 | Status: CANCELLED | OUTPATIENT
Start: 2024-02-08

## 2024-02-12 ENCOUNTER — TELEPHONE (OUTPATIENT)
Dept: PRIMARY CARE CLINIC | Age: 64
End: 2024-02-12

## 2024-02-12 NOTE — TELEPHONE ENCOUNTER
Spoke with patient, let her know she can call pharmacies and see who has Ozempic 1mg in stock and I can send it to that pharmacy. But if she does not find it anywhere she wants to switch it to something else. I notified her that Dr. Tom is out until tomorrow.

## 2024-02-12 NOTE — TELEPHONE ENCOUNTER
Patient called to know the status of her Semaglutide, 1 MG/DOSE, 4 MG/3ML     Please review and advice    Thanks

## 2024-04-27 ENCOUNTER — HOSPITAL ENCOUNTER (EMERGENCY)
Age: 64
Discharge: HOME | End: 2024-04-28
Attending: EMERGENCY MEDICINE
Payer: COMMERCIAL

## 2024-04-27 VITALS
WEIGHT: 235 LBS | BODY MASS INDEX: 44.37 KG/M2 | RESPIRATION RATE: 19 BRPM | OXYGEN SATURATION: 100 % | HEIGHT: 61 IN | SYSTOLIC BLOOD PRESSURE: 138 MMHG | DIASTOLIC BLOOD PRESSURE: 76 MMHG | HEART RATE: 93 BPM | TEMPERATURE: 97.9 F

## 2024-04-27 DIAGNOSIS — T78.3XXA ANGIOEDEMA, INITIAL ENCOUNTER: Primary | ICD-10-CM

## 2024-04-27 LAB
ALBUMIN SERPL-MCNC: 4 G/DL (ref 3.4–5)
ALBUMIN/GLOB SERPL: 1.1 {RATIO} (ref 1.1–2.2)
ALP SERPL-CCNC: 120 U/L (ref 40–129)
ALT SERPL-CCNC: 24 U/L (ref 10–40)
ANION GAP SERPL CALCULATED.3IONS-SCNC: 12 MMOL/L (ref 3–16)
AST SERPL-CCNC: 29 U/L (ref 15–37)
BASOPHILS # BLD: 0.1 K/UL (ref 0–0.2)
BASOPHILS NFR BLD: 0.6 %
BILIRUB SERPL-MCNC: <0.2 MG/DL (ref 0–1)
BUN SERPL-MCNC: 17 MG/DL (ref 7–20)
CALCIUM SERPL-MCNC: 9.5 MG/DL (ref 8.3–10.6)
CHLORIDE SERPL-SCNC: 103 MMOL/L (ref 99–110)
CO2 SERPL-SCNC: 23 MMOL/L (ref 21–32)
CREAT SERPL-MCNC: 0.8 MG/DL (ref 0.6–1.2)
DEPRECATED RDW RBC AUTO: 14.5 % (ref 12.4–15.4)
EOSINOPHIL # BLD: 0.1 K/UL (ref 0–0.6)
EOSINOPHIL NFR BLD: 1.4 %
GFR SERPLBLD CREATININE-BSD FMLA CKD-EPI: 82 ML/MIN/{1.73_M2}
GLUCOSE SERPL-MCNC: 150 MG/DL (ref 70–99)
HCT VFR BLD AUTO: 41.3 % (ref 36–48)
HGB BLD-MCNC: 13 G/DL (ref 12–16)
LYMPHOCYTES # BLD: 1.8 K/UL (ref 1–5.1)
LYMPHOCYTES NFR BLD: 19.4 %
MCH RBC QN AUTO: 26.2 PG (ref 26–34)
MCHC RBC AUTO-ENTMCNC: 31.4 G/DL (ref 31–36)
MCV RBC AUTO: 83.5 FL (ref 80–100)
MONOCYTES # BLD: 0.8 K/UL (ref 0–1.3)
MONOCYTES NFR BLD: 8.7 %
NEUTROPHILS # BLD: 6.5 K/UL (ref 1.7–7.7)
NEUTROPHILS NFR BLD: 69.9 %
PLATELET # BLD AUTO: 393 K/UL (ref 135–450)
PMV BLD AUTO: 7.7 FL (ref 5–10.5)
POTASSIUM SERPL-SCNC: 4.4 MMOL/L (ref 3.5–5.1)
PROT SERPL-MCNC: 7.7 G/DL (ref 6.4–8.2)
RBC # BLD AUTO: 4.95 M/UL (ref 4–5.2)
SODIUM SERPL-SCNC: 138 MMOL/L (ref 136–145)
WBC # BLD AUTO: 9.3 K/UL (ref 4–11)

## 2024-04-27 PROCEDURE — 85025 COMPLETE CBC W/AUTO DIFF WBC: CPT

## 2024-04-27 PROCEDURE — 2500000003 HC RX 250 WO HCPCS: Performed by: NURSE PRACTITIONER

## 2024-04-27 PROCEDURE — 96372 THER/PROPH/DIAG INJ SC/IM: CPT

## 2024-04-27 PROCEDURE — 6360000002 HC RX W HCPCS: Performed by: NURSE PRACTITIONER

## 2024-04-27 PROCEDURE — 99284 EMERGENCY DEPT VISIT MOD MDM: CPT

## 2024-04-27 PROCEDURE — 6360000002 HC RX W HCPCS: Performed by: EMERGENCY MEDICINE

## 2024-04-27 PROCEDURE — 96374 THER/PROPH/DIAG INJ IV PUSH: CPT

## 2024-04-27 PROCEDURE — 80053 COMPREHEN METABOLIC PANEL: CPT

## 2024-04-27 PROCEDURE — 96375 TX/PRO/DX INJ NEW DRUG ADDON: CPT

## 2024-04-27 PROCEDURE — 2580000003 HC RX 258: Performed by: NURSE PRACTITIONER

## 2024-04-27 RX ORDER — EPINEPHRINE 1 MG/ML
0.3 INJECTION, SOLUTION INTRAMUSCULAR; SUBCUTANEOUS ONCE
Status: COMPLETED | OUTPATIENT
Start: 2024-04-27 | End: 2024-04-27

## 2024-04-27 RX ORDER — DIPHENHYDRAMINE HYDROCHLORIDE 50 MG/ML
25 INJECTION INTRAMUSCULAR; INTRAVENOUS ONCE
Status: COMPLETED | OUTPATIENT
Start: 2024-04-27 | End: 2024-04-27

## 2024-04-27 RX ORDER — FAMOTIDINE 10 MG/ML
20 INJECTION, SOLUTION INTRAVENOUS ONCE
Status: COMPLETED | OUTPATIENT
Start: 2024-04-27 | End: 2024-04-27

## 2024-04-27 RX ORDER — DIPHENHYDRAMINE HYDROCHLORIDE 50 MG/ML
50 INJECTION INTRAMUSCULAR; INTRAVENOUS ONCE
Status: DISCONTINUED | OUTPATIENT
Start: 2024-04-27 | End: 2024-04-27

## 2024-04-27 RX ADMIN — FAMOTIDINE 20 MG: 10 INJECTION, SOLUTION INTRAVENOUS at 22:36

## 2024-04-27 RX ADMIN — WATER 125 MG: 1 INJECTION INTRAMUSCULAR; INTRAVENOUS; SUBCUTANEOUS at 22:49

## 2024-04-27 RX ADMIN — EPINEPHRINE 0.3 MG: 1 INJECTION INTRAMUSCULAR; INTRAVENOUS; SUBCUTANEOUS at 22:33

## 2024-04-27 RX ADMIN — DIPHENHYDRAMINE HYDROCHLORIDE 25 MG: 50 INJECTION, SOLUTION INTRAMUSCULAR; INTRAVENOUS at 22:51

## 2024-04-27 ASSESSMENT — LIFESTYLE VARIABLES
HOW MANY STANDARD DRINKS CONTAINING ALCOHOL DO YOU HAVE ON A TYPICAL DAY: 1 OR 2
HOW OFTEN DO YOU HAVE A DRINK CONTAINING ALCOHOL: MONTHLY OR LESS

## 2024-04-27 ASSESSMENT — PAIN - FUNCTIONAL ASSESSMENT: PAIN_FUNCTIONAL_ASSESSMENT: NONE - DENIES PAIN

## 2024-04-28 RX ORDER — PREDNISONE 10 MG/1
60 TABLET ORAL DAILY
Qty: 30 TABLET | Refills: 0 | Status: SHIPPED | OUTPATIENT
Start: 2024-04-28 | End: 2024-05-03

## 2024-04-28 RX ORDER — AMLODIPINE BESYLATE 10 MG/1
10 TABLET ORAL DAILY
Qty: 30 TABLET | Refills: 0 | Status: SHIPPED | OUTPATIENT
Start: 2024-04-28

## 2024-04-28 RX ORDER — DIPHENHYDRAMINE HCL 25 MG
25 CAPSULE ORAL EVERY 4 HOURS PRN
Qty: 1 CAPSULE | Refills: 0 | Status: SHIPPED | OUTPATIENT
Start: 2024-04-28 | End: 2024-05-08

## 2024-04-28 RX ORDER — FAMOTIDINE 20 MG/1
20 TABLET, FILM COATED ORAL DAILY
Qty: 5 TABLET | Refills: 3 | Status: SHIPPED | OUTPATIENT
Start: 2024-04-28

## 2024-04-28 NOTE — DISCHARGE INSTRUCTIONS
Please call your doctor Monday to discuss angioedema    This condition might be from your blood pressure medication- please do not take your blood pressure medication until you see your doctor    We have changed your blood pressure medication to amlodipine.  This may cause leg swelling.

## 2024-04-28 NOTE — ED PROVIDER NOTES
[facility] EMERGENCY DEPARTMENT  EMERGENCY DEPARTMENT ENCOUNTER        Pt Name: Shira Lucero  MRN: [id number]  Birthdate 1960  Date of evaluation: 4/27/2024  Provider: VINICIUS Contreras CNP  PCP: Jewel Tom MD  Note Started: 10:04 PM EDT 4/27/24       I have seen and evaluated this patient with my supervising physician José Manuel Contreras DO.      CHIEF COMPLAINT       Chief Complaint   Patient presents with    Allergic Reaction     Patient states she had sausage and egg this morning then went to [employer] then had chickfil then her lip started swelling. She is allergic to tree nuts and all seafood.        HISTORY OF PRESENT ILLNESS: 1 or more Elements     History from : Patient    Limitations to history : None    Shira Lucero is a 63 y.o. female who presents to the ER with upper lip swelling that began this evening on the left side and while driving to the ER the swelling increased to the right side.  Reports that her lip is tingling and uncomfortable.  She reports history of food allergies as well as history of upper lip swelling.  No swelling of tongue, no difficulty swallowing.    Denies any headache, fever, lightheadedness, dizziness, visual disturbances.  No chest pain or pressure.  No neck or back pain.  No shortness of breath, cough, or congestion.  No abdominal pain, nausea, vomiting, diarrhea, constipation, or dysuria.  No rash.    Nursing Notes were all reviewed and agreed with or any disagreements were addressed in the HPI.    REVIEW OF SYSTEMS :      Review of Systems   Constitutional:  Negative for activity change, chills and fever.   HENT:  Positive for facial swelling.    Respiratory:  Negative for chest tightness and shortness of breath.    Cardiovascular:  Negative for chest pain.   Gastrointestinal:  Negative for abdominal pain, diarrhea, nausea and vomiting.   Genitourinary:  Negative for dysuria.   All other systems reviewed and are

## 2024-04-29 NOTE — ED PROVIDER NOTES
In addition to the advanced practice provider, I personally saw Shira Lucero and performed a substantive portion of the visit including all aspects of the medical decision making. I made/approved the management plan and take responsibility for the patient management    Briefly, this is a 63 y.o. female here for upper lip swelling which began a couple hours prior to arrival while patient was eating.  Patient reports history of similar symptoms ongoing off and on for the past several weeks, seems to be related to food, possibly chicken, although she is not clearly identified any trigger.  Patient states she saw an allergist years ago and was prescribed an EpiPen which has since .  She denies any fevers, chills, shortness of breath or cough.  Does not take any ACE inhibitors.    On exam, patient afebrile and nontoxic. No distress. Heart RRR. Lungs CTAB.  Edema bilateral upper lip.  Normal appearance of posterior oropharynx, uvula midline, speech clear and tolerating oral secretions.  No tongue edema.  No trismus.            Screenings   Princess Coma Scale  Eye Opening: Spontaneous  Best Verbal Response: Oriented  Best Motor Response: Obeys commands  Princess Coma Scale Score: 15      Is this patient to be included in the SEP-1 Core Measure due to severe sepsis or septic shock?   No   Exclusion criteria - the patient is NOT to be included for SEP-1 Core Measure due to:  Infection is not suspected      MDM    Patient afebrile and nontoxic.  No distress.  No hypoxia or increased work of breathing.  Lungs clear to auscultation, normotensive, no anaphylaxis.  Angioedema of upper lip noted, however there is no involvement of the tongue or oropharynx, nothing to suggest impending upper airway obstruction.  Presentation is not consistent with infection.  Patient received epinephrine and allergy cocktail and was monitored in the emergency department for over 3 hours with no progression of symptoms, edema of upper lip

## 2024-05-02 ENCOUNTER — OFFICE VISIT (OUTPATIENT)
Dept: PRIMARY CARE CLINIC | Age: 64
End: 2024-05-02
Payer: COMMERCIAL

## 2024-05-02 VITALS
WEIGHT: 242 LBS | DIASTOLIC BLOOD PRESSURE: 85 MMHG | OXYGEN SATURATION: 97 % | HEIGHT: 61 IN | HEART RATE: 79 BPM | SYSTOLIC BLOOD PRESSURE: 130 MMHG | BODY MASS INDEX: 45.69 KG/M2

## 2024-05-02 DIAGNOSIS — Z91.018 ALLERGY TO TREE NUTS: ICD-10-CM

## 2024-05-02 DIAGNOSIS — E78.2 MIXED HYPERLIPIDEMIA: ICD-10-CM

## 2024-05-02 DIAGNOSIS — Z91.030 ALLERGIC TO BEES: ICD-10-CM

## 2024-05-02 DIAGNOSIS — I10 PRIMARY HYPERTENSION: ICD-10-CM

## 2024-05-02 DIAGNOSIS — T78.3XXA ANGIOEDEMA, INITIAL ENCOUNTER: ICD-10-CM

## 2024-05-02 DIAGNOSIS — E55.9 VITAMIN D DEFICIENCY: ICD-10-CM

## 2024-05-02 DIAGNOSIS — M25.473 ANKLE EDEMA: ICD-10-CM

## 2024-05-02 DIAGNOSIS — Z91.013 ALLERGY TO SHELLFISH: ICD-10-CM

## 2024-05-02 DIAGNOSIS — I10 PRIMARY HYPERTENSION: Primary | ICD-10-CM

## 2024-05-02 DIAGNOSIS — E11.9 TYPE 2 DIABETES MELLITUS WITHOUT COMPLICATION, WITHOUT LONG-TERM CURRENT USE OF INSULIN (HCC): ICD-10-CM

## 2024-05-02 LAB — HBA1C MFR BLD: 6.7 %

## 2024-05-02 PROCEDURE — 99214 OFFICE O/P EST MOD 30 MIN: CPT | Performed by: INTERNAL MEDICINE

## 2024-05-02 PROCEDURE — 3075F SYST BP GE 130 - 139MM HG: CPT | Performed by: INTERNAL MEDICINE

## 2024-05-02 PROCEDURE — 3044F HG A1C LEVEL LT 7.0%: CPT | Performed by: INTERNAL MEDICINE

## 2024-05-02 PROCEDURE — 83036 HEMOGLOBIN GLYCOSYLATED A1C: CPT | Performed by: INTERNAL MEDICINE

## 2024-05-02 PROCEDURE — 3079F DIAST BP 80-89 MM HG: CPT | Performed by: INTERNAL MEDICINE

## 2024-05-02 RX ORDER — EZETIMIBE 10 MG/1
10 TABLET ORAL DAILY
Qty: 30 TABLET | Refills: 3 | Status: SHIPPED | OUTPATIENT
Start: 2024-05-02

## 2024-05-02 RX ORDER — HYDROCHLOROTHIAZIDE 25 MG/1
25 TABLET ORAL EVERY MORNING
Qty: 90 TABLET | OUTPATIENT
Start: 2024-05-02

## 2024-05-02 RX ORDER — HYDROCHLOROTHIAZIDE 25 MG/1
25 TABLET ORAL EVERY MORNING
Qty: 30 TABLET | Refills: 3 | Status: SHIPPED | OUTPATIENT
Start: 2024-05-02

## 2024-05-02 RX ORDER — EPINEPHRINE 0.3 MG/.3ML
0.3 INJECTION SUBCUTANEOUS ONCE
Qty: 1 EACH | Refills: 5 | Status: SHIPPED | OUTPATIENT
Start: 2024-05-02 | End: 2024-05-02

## 2024-05-02 RX ORDER — ERGOCALCIFEROL 1.25 MG/1
50000 CAPSULE ORAL WEEKLY
Qty: 12 CAPSULE | Refills: 1 | Status: SHIPPED | OUTPATIENT
Start: 2024-05-02

## 2024-05-02 RX ORDER — EZETIMIBE 10 MG/1
10 TABLET ORAL DAILY
Qty: 90 TABLET | OUTPATIENT
Start: 2024-05-02

## 2024-05-02 SDOH — ECONOMIC STABILITY: FOOD INSECURITY: WITHIN THE PAST 12 MONTHS, YOU WORRIED THAT YOUR FOOD WOULD RUN OUT BEFORE YOU GOT MONEY TO BUY MORE.: NEVER TRUE

## 2024-05-02 SDOH — ECONOMIC STABILITY: INCOME INSECURITY: HOW HARD IS IT FOR YOU TO PAY FOR THE VERY BASICS LIKE FOOD, HOUSING, MEDICAL CARE, AND HEATING?: NOT HARD AT ALL

## 2024-05-02 SDOH — ECONOMIC STABILITY: FOOD INSECURITY: WITHIN THE PAST 12 MONTHS, THE FOOD YOU BOUGHT JUST DIDN'T LAST AND YOU DIDN'T HAVE MONEY TO GET MORE.: NEVER TRUE

## 2024-05-02 NOTE — PROGRESS NOTES
Component Date Value    Hemoglobin A1C 01/08/2024 6.7     Microalbumin, Random Uri* 01/08/2024 <1.20     Creatinine, Ur 01/08/2024 112.7     Microalbumin Creatinine * 01/08/2024 see below            Medications, Allergies, Social history, Medical history, and results reviewed      An electronic signature was used to authenticate this note.    -Jewel Tom MD

## 2024-05-06 ENCOUNTER — TELEPHONE (OUTPATIENT)
Dept: PRIMARY CARE CLINIC | Age: 64
End: 2024-05-06

## 2024-05-06 DIAGNOSIS — T78.3XXA ANGIOEDEMA, INITIAL ENCOUNTER: Primary | ICD-10-CM

## 2024-05-06 NOTE — TELEPHONE ENCOUNTER
Referral written for Dr. Leonel Gaxiola, patient notified and given referral information. Patient notified this nurse she did has a tiny flare up on her bottom lip with tingling of angioedema. She took benadryl and after awhile it went away.

## 2024-05-06 NOTE — TELEPHONE ENCOUNTER
Pt called and said a referral to an allergist was discussed at her appt on 5/2/24, but she did not have the allergist's information. No information about referral found in pt chart. She had another flare up this weekend. Please contact pt when available.

## 2024-05-13 PROBLEM — M25.473 ANKLE EDEMA: Status: ACTIVE | Noted: 2024-05-13

## 2024-05-13 PROBLEM — T78.3XXA ANGIO-EDEMA: Status: ACTIVE | Noted: 2024-05-13

## 2024-05-13 ASSESSMENT — ENCOUNTER SYMPTOMS
NAUSEA: 1
VOMITING: 0
DIARRHEA: 0
CONSTIPATION: 0
CHEST TIGHTNESS: 0
SINUS PRESSURE: 0
SORE THROAT: 0
SHORTNESS OF BREATH: 0
BLOOD IN STOOL: 0
ABDOMINAL PAIN: 0
WHEEZING: 0
COUGH: 0
FACIAL SWELLING: 1

## 2024-05-20 ENCOUNTER — OFFICE VISIT (OUTPATIENT)
Dept: PRIMARY CARE CLINIC | Age: 64
End: 2024-05-20
Payer: COMMERCIAL

## 2024-05-20 VITALS
BODY MASS INDEX: 45.24 KG/M2 | HEART RATE: 99 BPM | OXYGEN SATURATION: 95 % | HEIGHT: 61 IN | DIASTOLIC BLOOD PRESSURE: 72 MMHG | WEIGHT: 239.6 LBS | SYSTOLIC BLOOD PRESSURE: 130 MMHG | TEMPERATURE: 97.6 F

## 2024-05-20 DIAGNOSIS — E11.9 TYPE 2 DIABETES MELLITUS WITHOUT COMPLICATION, WITHOUT LONG-TERM CURRENT USE OF INSULIN (HCC): ICD-10-CM

## 2024-05-20 DIAGNOSIS — H26.9 CATARACT OF LEFT EYE, UNSPECIFIED CATARACT TYPE: Primary | ICD-10-CM

## 2024-05-20 DIAGNOSIS — M25.473 ANKLE EDEMA: ICD-10-CM

## 2024-05-20 DIAGNOSIS — I10 HYPERTENSION, UNSPECIFIED TYPE: ICD-10-CM

## 2024-05-20 DIAGNOSIS — E78.2 MIXED HYPERLIPIDEMIA: ICD-10-CM

## 2024-05-20 DIAGNOSIS — Z01.818 PRE-OP EXAMINATION: ICD-10-CM

## 2024-05-20 PROCEDURE — 1123F ACP DISCUSS/DSCN MKR DOCD: CPT | Performed by: FAMILY MEDICINE

## 2024-05-20 PROCEDURE — 99214 OFFICE O/P EST MOD 30 MIN: CPT | Performed by: FAMILY MEDICINE

## 2024-05-20 PROCEDURE — 3044F HG A1C LEVEL LT 7.0%: CPT | Performed by: FAMILY MEDICINE

## 2024-05-20 PROCEDURE — 3075F SYST BP GE 130 - 139MM HG: CPT | Performed by: FAMILY MEDICINE

## 2024-05-20 PROCEDURE — 3078F DIAST BP <80 MM HG: CPT | Performed by: FAMILY MEDICINE

## 2024-05-20 RX ORDER — HYDROCHLOROTHIAZIDE 25 MG/1
12.5 TABLET ORAL EVERY MORNING
Qty: 30 TABLET | Refills: 3
Start: 2024-05-20

## 2024-05-20 NOTE — PROGRESS NOTES
Subjective:   Reason for visit.   Shira Lucero is a 64 y.o. female who presents for a preoperative physical examination. She is scheduled to have left eye cataract surgery done by Dr. Hemphill at [facility] on 6/10/2024.    History of Present Illness:      Left eye cataract  Preop  -Patient has left eye cataract surgery on 6/10  -Topical/MAC anesthesia was being used  -As per paperwork from CV only history and physical required  -tolerated anaesthesia in the past - no problems waking up or hallucinations  - right eye will be later     Diabetes mellitus type 2  Hyperlipidemia  -A1c on 5/2/2024 was 6.7  -On Ozempic  -On Zetia    Hypertension  -Blood pressure stable at 130/72  -Current medication include amlodipine, stopped hydrochlorothiazide  - was on losartan but had angioedema    Chronic SOB  - anytime she is \"doing something\"  - \"can't walk fast\" as she is out of breath - starts wheezing  - on Qvar daily and albuterol prn       Past Medical History:   Diagnosis Date    Allergic rhinitis     Arthrosis of hip     Carpal tunnel syndrome     Colon polyps 6/4/14    colonoscopy done    Eczema     GERD (gastroesophageal reflux disease)     Hyperlipidemia     Knee pain     Obstructive sleep apnea (adult) (pediatric)     Osteopenia     Retrocalcaneal bursitis (back of heel) 7/20/2017    Unspecified sleep apnea       No previous anesthesia complications.    Past Surgical History:   Procedure Laterality Date    COLONOSCOPY  6/14/14    colon polyps and diverticulosis    COLONOSCOPY  02/21/2017    Deepti QUIÑONEZ    EYE SURGERY Bilateral 4/2004    lasix    HEMORRHOID SURGERY      2/2017    HYSTERECTOMY (CERVIX STATUS UNKNOWN)  1/2002    complete    OVARY REMOVAL      bilateral - age 41                                                 Current Outpatient Medications   Medication Sig Dispense Refill    hydroCHLOROthiazide (HYDRODIURIL) 25 MG tablet Take 0.5 tablets by mouth every morning 30 tablet 3    EPINEPHrine (EPIPEN 2-MELANIE) 0.3 MG/0.3ML

## 2024-06-19 ENCOUNTER — OFFICE VISIT (OUTPATIENT)
Dept: PRIMARY CARE CLINIC | Age: 64
End: 2024-06-19
Payer: COMMERCIAL

## 2024-06-19 VITALS
DIASTOLIC BLOOD PRESSURE: 74 MMHG | OXYGEN SATURATION: 98 % | TEMPERATURE: 97.8 F | BODY MASS INDEX: 45.05 KG/M2 | WEIGHT: 238.6 LBS | HEART RATE: 89 BPM | SYSTOLIC BLOOD PRESSURE: 126 MMHG | HEIGHT: 61 IN

## 2024-06-19 DIAGNOSIS — E78.2 MIXED HYPERLIPIDEMIA: ICD-10-CM

## 2024-06-19 DIAGNOSIS — E55.9 VITAMIN D DEFICIENCY: ICD-10-CM

## 2024-06-19 DIAGNOSIS — E11.9 TYPE 2 DIABETES MELLITUS WITHOUT COMPLICATION, WITHOUT LONG-TERM CURRENT USE OF INSULIN (HCC): Primary | ICD-10-CM

## 2024-06-19 LAB
25(OH)D3 SERPL-MCNC: 19.5 NG/ML
CHOLEST SERPL-MCNC: 258 MG/DL (ref 0–199)
HDLC SERPL-MCNC: 67 MG/DL (ref 40–60)
LDLC SERPL CALC-MCNC: 174 MG/DL
TRIGL SERPL-MCNC: 85 MG/DL (ref 0–150)
VLDLC SERPL CALC-MCNC: 17 MG/DL

## 2024-06-19 PROCEDURE — 1123F ACP DISCUSS/DSCN MKR DOCD: CPT | Performed by: INTERNAL MEDICINE

## 2024-06-19 PROCEDURE — 99214 OFFICE O/P EST MOD 30 MIN: CPT | Performed by: INTERNAL MEDICINE

## 2024-06-19 PROCEDURE — 3074F SYST BP LT 130 MM HG: CPT | Performed by: INTERNAL MEDICINE

## 2024-06-19 PROCEDURE — 3078F DIAST BP <80 MM HG: CPT | Performed by: INTERNAL MEDICINE

## 2024-06-19 PROCEDURE — 3044F HG A1C LEVEL LT 7.0%: CPT | Performed by: INTERNAL MEDICINE

## 2024-06-19 RX ORDER — TIRZEPATIDE 5 MG/.5ML
5 INJECTION, SOLUTION SUBCUTANEOUS WEEKLY
Qty: 2 ML | Refills: 1 | Status: SHIPPED | OUTPATIENT
Start: 2024-06-19

## 2024-06-19 NOTE — PATIENT INSTRUCTIONS
-Low fat, low cholesterol diet    -Limit carbohydrates to 45 grams with meals and 15 grams with snacks  -monitor blood sugars  -goal for blood sugar fasting or pre-meal  is   -goal for blood sugar 2 hours after a meal is less than 180  -goal for blood sugar at bedtime is less than 150  -Regular aerobic exercise

## 2024-06-19 NOTE — PROGRESS NOTES
Date of Visit: 2024    Shira Lucero (:  1960) is a 64 y.o. female,  Established patient here for evaluation of the following chief complaint(s):  Discuss Medications (Ozempic not working )      ASSESSMENT/PLAN:    1. Type 2 diabetes mellitus without complication, without long-term current use of insulin (HCC)  -Hemoglobin A1c of 6.7% done on 24 shows diabetes is controlled  -Discontinue Ozempic  -Start Tirzepatide (MOUNJARO) 5 MG/0.5ML SOPN SC injection; Inject 0.5 mLs into the skin once a week for diabetes and weight loss Dispense: 2 mL; Refill: 1  -Limit carbohydrates to 45 grams with meals and 15 grams with snacks  -monitor blood sugars  -goal for blood sugar fasting or pre-meal  is   -goal for blood sugar 2 hours after a meal is less than 180  -goal for blood sugar at bedtime is less than 150  -Regular aerobic exercise    2. Mixed hyperlipidemia  -not controlled  -patient doesn't tolerate statins  -Continue Zetia 10mg once daily  -Low fat, low cholesterol diet  -Regular aerobic exercise  -Lipid Panel; Future    3. Vitamin D deficiency  -not controlled  -Continue Vitamin D 50,000 IU weekly  -Vitamin D 25 Hydroxy; Future          Return in about 2 months (around 2024) for annual physical exam.    SUBJECTIVE:    Patient has Type 2 diabetes. Patient takes Ozempic and states it helps diabetes but she is not losing weight. Patient decreases carbohydrates. Patient states she has no appetite. Patient states she doesn't eat much. Patient states she has not exercised the past month. Patient states she hasn't had any noticeable difference in weight with the dose increases in Ozempic.     Patient has hyperlipidemia. Patient tolerates Zetia. Patient decreases fat and cholesterol.     Patient has vitamin D deficiency. Patient takes vitamin D 50,000 IU weekly.        Review of Systems   Constitutional:  Negative for activity change, chills, fatigue, fever and unexpected weight change.

## 2024-06-29 ASSESSMENT — ENCOUNTER SYMPTOMS
CONSTIPATION: 0
DIARRHEA: 0
NAUSEA: 0
ABDOMINAL PAIN: 0
RHINORRHEA: 0
VOMITING: 0
CHEST TIGHTNESS: 0
COUGH: 0
SORE THROAT: 0
WHEEZING: 0
SHORTNESS OF BREATH: 0

## 2024-07-05 RX ORDER — AMLODIPINE BESYLATE 10 MG/1
10 TABLET ORAL DAILY
Qty: 90 TABLET | Refills: 0 | Status: SHIPPED | OUTPATIENT
Start: 2024-07-05

## 2024-07-05 NOTE — TELEPHONE ENCOUNTER
Patient called and requested to refill the following medications    amLODIPine (NORVASC) 10 MG tablet     The preferred pharmacy    [id number] - [place], [place] - [address] - P [phone] - F [phone]  [address] [place] [address]  Phone: [phone]  Fax: [phone]     Pl review    thanks

## 2024-07-05 NOTE — TELEPHONE ENCOUNTER
Medication:   Requested Prescriptions     Pending Prescriptions Disp Refills    amLODIPine (NORVASC) 10 MG tablet 30 tablet 0     Sig: Take 1 tablet by mouth daily     Last Filled:  04/28/24    Last appt: 6/19/2024   Next appt: Visit date not found    Last OARRS:        No data to display

## 2024-07-31 ENCOUNTER — PROCEDURE VISIT (OUTPATIENT)
Dept: NEUROLOGY | Age: 64
End: 2024-07-31
Payer: COMMERCIAL

## 2024-07-31 DIAGNOSIS — R20.2 NUMBNESS AND TINGLING OF BOTH FEET: Primary | ICD-10-CM

## 2024-07-31 DIAGNOSIS — R20.0 NUMBNESS AND TINGLING OF BOTH FEET: Primary | ICD-10-CM

## 2024-07-31 PROCEDURE — 95909 NRV CNDJ TST 5-6 STUDIES: CPT | Performed by: PSYCHIATRY & NEUROLOGY

## 2024-07-31 PROCEDURE — 95886 MUSC TEST DONE W/N TEST COMP: CPT | Performed by: PSYCHIATRY & NEUROLOGY

## 2024-07-31 NOTE — PATIENT INSTRUCTIONS
Verbal consent was obtained from patient and/or patient's advocate for in office procedure with Dr. Adina Doyle MD (EMG or EEG).

## 2024-07-31 NOTE — PROGRESS NOTES
Dear Jewel Tom MD  [address]  [place] [address]      I had the pleasure of seeing your patient Shira Lucero today for EMG and NCV. I have attached a detailed summary below:    Electromyography report      [facility]  [facility], [address]  [place] [id number]      Patient: Shira Lucero    MR Number: [id number]  YOB: 1960  Date of Visit: 7/31/2024      Clinical history:  The patient is a 64 y.o. years old female with chronic bilateral feet dysesthesias.  On exam: No focal weakness or sensory deficit.  No atrophy.  2+ DTRs.    Findings:   For full details about such findings, please see attached report. Needle examination was recorded using monopolar needle in selected muscles. Unless otherwise noted, the temperature of the limb was monitored and maintained between 32-36°C during the performance of the NCV testing.     Right peroneal motor distal latency, amplitude and conduction velocity were within normal limits.  2.   Left peroneal motor distal latency, amplitude and conduction velocity were within normal  3.  Right posterior tibial motor distal latency, amplitude and conduction velocity were within normal limit  4.  Left posterior tibial motor distal latency, amplitude and conduction velocity were within normal  5.  Right sural sensory response was normal  6.  Left sural sensory response was normal.  7.  Needle examinations of bilateral lower extremities were performed in selected muscles. Needle examination of the selected muscle showed normal insertional activities, morphology, amplitude, and recruitment of motor unit potential.    Impression:    This test is within normal limits. The electrophysiological pattern showed no evidence of polyneuropathy, plexopathy, or radiculopathy.    Adina Smith MD    Diplomate of the American board of electrodiagnostics.

## 2024-08-18 DIAGNOSIS — E11.9 TYPE 2 DIABETES MELLITUS WITHOUT COMPLICATION, WITHOUT LONG-TERM CURRENT USE OF INSULIN (HCC): ICD-10-CM

## 2024-08-19 NOTE — TELEPHONE ENCOUNTER
Medication:   Requested Prescriptions     Pending Prescriptions Disp Refills    MOUNJARO 5 MG/0.5ML SOPN SC injection [Pharmacy Med Name: MOUNJARO 5 MG/0.5 ML PEN]  1     Sig: INJECT 1 PEN(5MG) INTO THE SKIN ONCE A WEEK     Last Filled:  6.19.24    Last appt: 6/19/2024   Next appt: Visit date not found    Last Labs DM:   Lab Results   Component Value Date/Time    LABA1C 6.7 05/02/2024 08:45 AM

## 2024-08-20 RX ORDER — TIRZEPATIDE 5 MG/.5ML
INJECTION, SOLUTION SUBCUTANEOUS
Qty: 2 ML | Refills: 0 | Status: SHIPPED | OUTPATIENT
Start: 2024-08-20

## 2024-09-23 ENCOUNTER — TELEPHONE (OUTPATIENT)
Dept: PRIMARY CARE CLINIC | Age: 64
End: 2024-09-23

## 2024-09-27 DIAGNOSIS — E11.9 TYPE 2 DIABETES MELLITUS WITHOUT COMPLICATION, WITHOUT LONG-TERM CURRENT USE OF INSULIN (HCC): ICD-10-CM

## 2024-09-27 NOTE — TELEPHONE ENCOUNTER
Medication:   Requested Prescriptions     Pending Prescriptions Disp Refills    MOUNJARO 5 MG/0.5ML SOPN SC injection [Pharmacy Med Name: MOUNJARO 5 MG/0.5 ML PEN]       Sig: INJECT 1 PEN(5MG) INTO THE SKIN ONCE A WEEK     Last Filled:  8/20/24    Last appt: 6/19/2024   Next appt: LVM, OV needed    Last OARRS:        No data to display

## 2024-09-30 RX ORDER — TIRZEPATIDE 5 MG/.5ML
INJECTION, SOLUTION SUBCUTANEOUS
Qty: 2 ADJUSTABLE DOSE PRE-FILLED PEN SYRINGE | Refills: 0 | Status: SHIPPED | OUTPATIENT
Start: 2024-09-30

## 2024-10-17 ENCOUNTER — OFFICE VISIT (OUTPATIENT)
Dept: PRIMARY CARE CLINIC | Age: 64
End: 2024-10-17
Payer: COMMERCIAL

## 2024-10-17 VITALS
BODY MASS INDEX: 44.18 KG/M2 | HEIGHT: 61 IN | TEMPERATURE: 98.1 F | SYSTOLIC BLOOD PRESSURE: 120 MMHG | RESPIRATION RATE: 16 BRPM | WEIGHT: 234 LBS | HEART RATE: 99 BPM | OXYGEN SATURATION: 99 % | DIASTOLIC BLOOD PRESSURE: 78 MMHG

## 2024-10-17 DIAGNOSIS — E11.9 TYPE 2 DIABETES MELLITUS WITHOUT COMPLICATION, WITHOUT LONG-TERM CURRENT USE OF INSULIN (HCC): ICD-10-CM

## 2024-10-17 DIAGNOSIS — I10 PRIMARY HYPERTENSION: ICD-10-CM

## 2024-10-17 DIAGNOSIS — E78.2 MIXED HYPERLIPIDEMIA: ICD-10-CM

## 2024-10-17 DIAGNOSIS — M76.60 ACHILLES TENDON PAIN: ICD-10-CM

## 2024-10-17 DIAGNOSIS — E11.9 TYPE 2 DIABETES MELLITUS WITHOUT COMPLICATION, WITHOUT LONG-TERM CURRENT USE OF INSULIN (HCC): Primary | ICD-10-CM

## 2024-10-17 DIAGNOSIS — Z78.0 POSTMENOPAUSE: ICD-10-CM

## 2024-10-17 DIAGNOSIS — E55.9 VITAMIN D DEFICIENCY: ICD-10-CM

## 2024-10-17 DIAGNOSIS — R22.42 SUBCUTANEOUS NODULE OF LEFT LOWER LEG: ICD-10-CM

## 2024-10-17 LAB
ALBUMIN SERPL-MCNC: 4 G/DL (ref 3.4–5)
ALBUMIN/GLOB SERPL: 1.6 {RATIO} (ref 1.1–2.2)
ALP SERPL-CCNC: 104 U/L (ref 40–129)
ALT SERPL-CCNC: 30 U/L (ref 10–40)
ANION GAP SERPL CALCULATED.3IONS-SCNC: 11 MMOL/L (ref 3–16)
AST SERPL-CCNC: 24 U/L (ref 15–37)
BILIRUB SERPL-MCNC: <0.2 MG/DL (ref 0–1)
BUN SERPL-MCNC: 16 MG/DL (ref 7–20)
CALCIUM SERPL-MCNC: 9.6 MG/DL (ref 8.3–10.6)
CHLORIDE SERPL-SCNC: 102 MMOL/L (ref 99–110)
CHOLEST SERPL-MCNC: 276 MG/DL (ref 0–199)
CO2 SERPL-SCNC: 27 MMOL/L (ref 21–32)
CREAT SERPL-MCNC: 1 MG/DL (ref 0.6–1.2)
GFR SERPLBLD CREATININE-BSD FMLA CKD-EPI: 62 ML/MIN/{1.73_M2}
GLUCOSE SERPL-MCNC: 81 MG/DL (ref 70–99)
HBA1C MFR BLD: 7 %
HDLC SERPL-MCNC: 67 MG/DL (ref 40–60)
LDLC SERPL CALC-MCNC: 192 MG/DL
POTASSIUM SERPL-SCNC: 4.2 MMOL/L (ref 3.5–5.1)
PROT SERPL-MCNC: 6.5 G/DL (ref 6.4–8.2)
SODIUM SERPL-SCNC: 140 MMOL/L (ref 136–145)
TRIGL SERPL-MCNC: 87 MG/DL (ref 0–150)
VLDLC SERPL CALC-MCNC: 17 MG/DL

## 2024-10-17 PROCEDURE — 99214 OFFICE O/P EST MOD 30 MIN: CPT | Performed by: INTERNAL MEDICINE

## 2024-10-17 PROCEDURE — 3078F DIAST BP <80 MM HG: CPT | Performed by: INTERNAL MEDICINE

## 2024-10-17 PROCEDURE — 83036 HEMOGLOBIN GLYCOSYLATED A1C: CPT | Performed by: INTERNAL MEDICINE

## 2024-10-17 PROCEDURE — 3051F HG A1C>EQUAL 7.0%<8.0%: CPT | Performed by: INTERNAL MEDICINE

## 2024-10-17 PROCEDURE — 3074F SYST BP LT 130 MM HG: CPT | Performed by: INTERNAL MEDICINE

## 2024-10-17 PROCEDURE — 1123F ACP DISCUSS/DSCN MKR DOCD: CPT | Performed by: INTERNAL MEDICINE

## 2024-10-17 NOTE — PROGRESS NOTES
Neurological:  Negative for dizziness, syncope, light-headedness, numbness and headaches.       Allergies   Allergen Reactions    Bee Venom Swelling    Fish-Derived Products Swelling    Losartan Angioedema    Other Swelling     TREE NUTS    Peanut-Containing Drug Products     Shellfish Allergy Swelling    Tylenol [Acetaminophen]      Tylenol 3 makes pt itch       Prior to Visit Medications    Medication Sig Taking? Authorizing Provider   Tirzepatide (MOUNJARO) 5 MG/0.5ML SOPN SC injection INJECT 1 PEN(5MG) INTO THE SKIN ONCE A WEEK Yes Jewel Tom MD   amLODIPine (NORVASC) 10 MG tablet Take 1 tablet by mouth daily Yes Teodora Wilkinson MD   hydroCHLOROthiazide (HYDRODIURIL) 25 MG tablet Take 0.5 tablets by mouth every morning Yes Suzy Mattson MD   vitamin D (ERGOCALCIFEROL) 1.25 MG (61743 UT) CAPS capsule Take 1 capsule by mouth once a week Yes Jewel Tom MD   ezetimibe (ZETIA) 10 MG tablet Take 1 tablet by mouth daily Yes Jewel Tom MD   famotidine (PEPCID) 20 MG tablet Take 1 tablet by mouth daily Yes Nicolasa Mendez APRN - CNP   Coenzyme Q10 (COQ10 PO) Take by mouth Yes ProviderSobeida MD   Semaglutide, 1 MG/DOSE, 4 MG/3ML SOPN Inject 1 mg into the skin once a week Yes Jewel Tom MD   albuterol sulfate HFA (PROVENTIL;VENTOLIN;PROAIR) 108 (90 Base) MCG/ACT inhaler INHALE 2 PUFFS INTO THE LUNGS Q6H AS NEEDED FOR WHEEZING Yes Jewel Tom MD   naproxen (NAPROSYN) 500 MG tablet Take 1 tablet by mouth 2 times daily (with meals) Yes Jewel Tom MD   fluticasone (FLONASE) 50 MCG/ACT nasal spray 2 sprays by Each Nostril route daily Yes Sen Coats DO   vitamin C (ASCORBIC ACID) 500 MG tablet Take 1 tablet by mouth daily Yes ProviderSobeida MD   Blood Glucose Monitoring Suppl (BLOOD GLUCOSE MONITOR SYSTEM) w/Device KIT Dispense glucometer covered by patient's insurance Yes Jewel Tom MD   blood glucose monitor strips Test once daily Yes

## 2024-10-28 PROBLEM — R22.42 SUBCUTANEOUS NODULE OF LEFT LOWER LEG: Status: ACTIVE | Noted: 2024-10-28

## 2024-10-28 PROBLEM — Z78.0 POSTMENOPAUSE: Status: ACTIVE | Noted: 2024-10-28

## 2024-10-28 PROBLEM — M76.60 ACHILLES TENDON PAIN: Status: ACTIVE | Noted: 2024-10-28

## 2024-10-28 ASSESSMENT — ENCOUNTER SYMPTOMS
BLOOD IN STOOL: 0
TROUBLE SWALLOWING: 0
ABDOMINAL PAIN: 0
SHORTNESS OF BREATH: 0
SORE THROAT: 0
VOMITING: 0
RHINORRHEA: 0
NAUSEA: 0
CONSTIPATION: 0
DIARRHEA: 0
WHEEZING: 0
CHEST TIGHTNESS: 0
COUGH: 0

## 2024-10-28 NOTE — ASSESSMENT & PLAN NOTE
-Hemoglobin A1c of 7.0% is near goal  -Increase Mounjaro to 7.5 mg SC weekly  -Limit carbohydrates to 45 grams with meals and 15 grams with snacks  -monitor blood sugars  -goal for blood sugar fasting or pre-meal  is   -goal for blood sugar 2 hours after a meal is less than 180  -goal for blood sugar at bedtime is less than 150  -Regular aerobic exercise

## 2024-10-28 NOTE — ASSESSMENT & PLAN NOTE
-stable  -Continue amlodipine 10 mg once daily  -Continue HCTZ 25 mg once daily  -Low sodium diet  -Regular aerobic exercise

## 2024-10-28 NOTE — ASSESSMENT & PLAN NOTE
-Not controlled  -LDL is not at goal  -Patient is not compliant with Zetia and does not tolerate statins  -Continue Zetia 10 mg once daily  -Low fat, low cholesterol diet  -Regular aerobic exercise

## 2024-10-31 ENCOUNTER — TELEPHONE (OUTPATIENT)
Dept: ADMINISTRATIVE | Age: 64
End: 2024-10-31

## 2024-10-31 NOTE — TELEPHONE ENCOUNTER
Submitted PA for Mounjaro 5MG/0.5ML auto-injectors   Via CMM Key: [id number]  STATUS: PENDING.    Follow up done daily; if no decision with in three days we will refax.  If another three days goes by with no decision will call the insurance for status.

## 2024-10-31 NOTE — TELEPHONE ENCOUNTER
The medication is APPROVED.      10/1/24-10/31/25    If this requires a response please respond to the pool ( P MHCX PSC MEDICATION PRE-AUTH).      Thank you please advise patient.

## 2024-11-26 ENCOUNTER — HOSPITAL ENCOUNTER (OUTPATIENT)
Dept: GENERAL RADIOLOGY | Age: 64
Discharge: HOME | End: 2024-11-26
Payer: COMMERCIAL

## 2024-11-26 ENCOUNTER — HOSPITAL ENCOUNTER (OUTPATIENT)
Dept: ULTRASOUND IMAGING | Age: 64
Discharge: HOME | End: 2024-11-26
Payer: COMMERCIAL

## 2024-11-26 DIAGNOSIS — Z78.0 POSTMENOPAUSE: ICD-10-CM

## 2024-11-26 DIAGNOSIS — R22.42 SUBCUTANEOUS NODULE OF LEFT LOWER LEG: ICD-10-CM

## 2024-11-26 PROCEDURE — 76882 US LMTD JT/FCL EVL NVASC XTR: CPT

## 2024-11-26 PROCEDURE — 77080 DXA BONE DENSITY AXIAL: CPT

## 2025-01-03 RX ORDER — AMLODIPINE BESYLATE 10 MG/1
10 TABLET ORAL DAILY
Qty: 90 TABLET | Refills: 0 | Status: SHIPPED | OUTPATIENT
Start: 2025-01-03

## 2025-01-03 NOTE — TELEPHONE ENCOUNTER
Medication:   Requested Prescriptions     Pending Prescriptions Disp Refills    amLODIPine (NORVASC) 10 MG tablet [Pharmacy Med Name: AMLODIPINE BESYLATE 10MG TABLETS] 90 tablet 0     Sig: TAKE 1 TABLET BY MOUTH DAILY     Last Filled:  7.5.24    Last appt: 10/17/2024   Next appt: 1/17/2025    Last OARRS:        No data to display

## 2025-01-17 ENCOUNTER — OFFICE VISIT (OUTPATIENT)
Dept: PRIMARY CARE CLINIC | Age: 65
End: 2025-01-17

## 2025-01-17 VITALS
TEMPERATURE: 96.9 F | DIASTOLIC BLOOD PRESSURE: 86 MMHG | HEIGHT: 61 IN | WEIGHT: 238 LBS | HEART RATE: 92 BPM | SYSTOLIC BLOOD PRESSURE: 134 MMHG | BODY MASS INDEX: 44.93 KG/M2 | OXYGEN SATURATION: 99 % | RESPIRATION RATE: 16 BRPM

## 2025-01-17 DIAGNOSIS — E78.2 MIXED HYPERLIPIDEMIA: ICD-10-CM

## 2025-01-17 DIAGNOSIS — E66.01 CLASS 3 SEVERE OBESITY WITH SERIOUS COMORBIDITY AND BODY MASS INDEX (BMI) OF 40.0 TO 44.9 IN ADULT, UNSPECIFIED OBESITY TYPE: ICD-10-CM

## 2025-01-17 DIAGNOSIS — M25.512 BILATERAL SHOULDER PAIN, UNSPECIFIED CHRONICITY: ICD-10-CM

## 2025-01-17 DIAGNOSIS — E66.813 CLASS 3 SEVERE OBESITY WITH SERIOUS COMORBIDITY AND BODY MASS INDEX (BMI) OF 40.0 TO 44.9 IN ADULT, UNSPECIFIED OBESITY TYPE: ICD-10-CM

## 2025-01-17 DIAGNOSIS — E11.9 TYPE 2 DIABETES MELLITUS WITHOUT COMPLICATION, WITHOUT LONG-TERM CURRENT USE OF INSULIN (HCC): Primary | ICD-10-CM

## 2025-01-17 DIAGNOSIS — I10 PRIMARY HYPERTENSION: ICD-10-CM

## 2025-01-17 DIAGNOSIS — M25.511 BILATERAL SHOULDER PAIN, UNSPECIFIED CHRONICITY: ICD-10-CM

## 2025-01-17 LAB — HBA1C MFR BLD: 6.3 %

## 2025-01-17 RX ORDER — TIRZEPATIDE 10 MG/.5ML
10 INJECTION, SOLUTION SUBCUTANEOUS WEEKLY
Qty: 6 ML | Refills: 1 | Status: SHIPPED | OUTPATIENT
Start: 2025-01-17

## 2025-01-17 SDOH — ECONOMIC STABILITY: FOOD INSECURITY: WITHIN THE PAST 12 MONTHS, THE FOOD YOU BOUGHT JUST DIDN'T LAST AND YOU DIDN'T HAVE MONEY TO GET MORE.: NEVER TRUE

## 2025-01-17 SDOH — ECONOMIC STABILITY: FOOD INSECURITY: WITHIN THE PAST 12 MONTHS, YOU WORRIED THAT YOUR FOOD WOULD RUN OUT BEFORE YOU GOT MONEY TO BUY MORE.: NEVER TRUE

## 2025-01-17 ASSESSMENT — PATIENT HEALTH QUESTIONNAIRE - PHQ9
SUM OF ALL RESPONSES TO PHQ QUESTIONS 1-9: 0
10. IF YOU CHECKED OFF ANY PROBLEMS, HOW DIFFICULT HAVE THESE PROBLEMS MADE IT FOR YOU TO DO YOUR WORK, TAKE CARE OF THINGS AT HOME, OR GET ALONG WITH OTHER PEOPLE: NOT DIFFICULT AT ALL
SUM OF ALL RESPONSES TO PHQ QUESTIONS 1-9: 0
SUM OF ALL RESPONSES TO PHQ9 QUESTIONS 1 & 2: 0
2. FEELING DOWN, DEPRESSED OR HOPELESS: NOT AT ALL
8. MOVING OR SPEAKING SO SLOWLY THAT OTHER PEOPLE COULD HAVE NOTICED. OR THE OPPOSITE, BEING SO FIGETY OR RESTLESS THAT YOU HAVE BEEN MOVING AROUND A LOT MORE THAN USUAL: NOT AT ALL
SUM OF ALL RESPONSES TO PHQ QUESTIONS 1-9: 0
9. THOUGHTS THAT YOU WOULD BE BETTER OFF DEAD, OR OF HURTING YOURSELF: NOT AT ALL
1. LITTLE INTEREST OR PLEASURE IN DOING THINGS: NOT AT ALL
5. POOR APPETITE OR OVEREATING: NOT AT ALL
3. TROUBLE FALLING OR STAYING ASLEEP: NOT AT ALL
6. FEELING BAD ABOUT YOURSELF - OR THAT YOU ARE A FAILURE OR HAVE LET YOURSELF OR YOUR FAMILY DOWN: NOT AT ALL
SUM OF ALL RESPONSES TO PHQ QUESTIONS 1-9: 0
7. TROUBLE CONCENTRATING ON THINGS, SUCH AS READING THE NEWSPAPER OR WATCHING TELEVISION: NOT AT ALL
4. FEELING TIRED OR HAVING LITTLE ENERGY: NOT AT ALL

## 2025-01-17 NOTE — PROGRESS NOTES
Date of Visit: 2025    Shira Lucero (:  1960) is a 64 y.o. female,  Established patient here for evaluation of the following chief complaint(s):  Diabetes, Hypertension, and Hyperlipidemia      ASSESSMENT/PLAN:    1. Type 2 diabetes mellitus without complication, without long-term current use of insulin (HCC)  Assessment & Plan:  -Hemoglobin A1c of 6.3 shows diabetes is well-controlled  -Increase Mounjaro to 12.5 mg SC weekly  -Limit carbohydrates to 45 grams with meals and 15 grams with snacks  -monitor blood sugars  -goal for blood sugar fasting or pre-meal  is   -goal for blood sugar 2 hours after a meal is less than 180  -goal for blood sugar at bedtime is less than 150  -Regular aerobic exercise   Orders:  -     POCT glycosylated hemoglobin (Hb A1C)  -     Tirzepatide (MOUNJARO) 10 MG/0.5ML SOAJ; Inject 10 mg into the skin once a week, Disp-6 mL, R-1Normal  2. Primary hypertension  Assessment & Plan:  -stable  -Continue amlodipine 10 mg once daily  -Continue HCTZ 25 mg once daily  -Low sodium diet  -Regular aerobic exercise   3. Mixed hyperlipidemia  Assessment & Plan:  -Not controlled  -LDL is not at goal  -Patient is not compliant with Zetia and does not tolerate statins  -Continue Zetia 10 mg once daily  -start Repatha 140mg SC every 14 days  -Low fat, low cholesterol diet  -Regular aerobic exercise  Orders:  -     Evolocumab 140 MG/ML SOAJ; Inject 140 mg into the skin every 14 days, Disp-2 mL, R-3Dispense sureclick auto injectorNormal  4. Class 3 severe obesity with serious comorbidity and body mass index (BMI) of 40.0 to 44.9 in adult, unspecified obesity type  Assessment & Plan:  -BMI 44.97  -Increase Mounjaro to 12.5 mg SC weekly for diabetes and weight loss  -Limit carbohydrates to 45 grams with meals and 15 grams with snacks  -Low sodium diet  -Low fat, low cholesterol diet  -Regular aerobic exercise  -Flank 64 ounces or more of water per day  5. Bilateral shoulder pain,

## 2025-01-19 DIAGNOSIS — E55.9 VITAMIN D DEFICIENCY: ICD-10-CM

## 2025-01-20 NOTE — TELEPHONE ENCOUNTER
Medication:   Requested Prescriptions     Pending Prescriptions Disp Refills    vitamin D (ERGOCALCIFEROL) 1.25 MG (81149 UT) CAPS capsule [Pharmacy Med Name: VITAMIN D2 50,000IU (ERGO) CAP RX] 12 capsule 1     Sig: TAKE 1 CAPSULE BY MOUTH 1 TIME A WEEK     Last Filled:  5/2/2024    Last appt: 1/17/2025   Next appt: Visit date not found    Last OARRS:        No data to display

## 2025-01-21 RX ORDER — ERGOCALCIFEROL 1.25 MG/1
50000 CAPSULE, LIQUID FILLED ORAL WEEKLY
Qty: 12 CAPSULE | Refills: 1 | Status: SHIPPED | OUTPATIENT
Start: 2025-01-21

## 2025-01-22 ENCOUNTER — TELEPHONE (OUTPATIENT)
Dept: ADMINISTRATIVE | Age: 65
End: 2025-01-22

## 2025-01-22 NOTE — TELEPHONE ENCOUNTER
Submitted PA for Repatha SureClick 140MG/ML auto-injectors   Via CMM Key: [id number]  STATUS: NOT SENT     [place]in from plan for pa request     .Has the patient been assessed for high risk of atherosclerotic cardiovascular disease (ASCVD) or cardiovascular event based on a 10 year risk score by either the ASCVD Pooled Cohort Risk Assessment or the [place] Risk Score?*     Yes, by the ASCVD Pooled Cohort Risk Assessment.  Yes, by the [place] Risk Score.  No      Please advise     If this requires a response please respond to the pool ( P MHCX PSC MEDICATION PRE-AUTH).      Thank you please advise patient.

## 2025-01-24 NOTE — TELEPHONE ENCOUNTER
Second request     Eufemia from plan for pa request      .Has the patient been assessed for high risk of atherosclerotic cardiovascular disease (ASCVD) or cardiovascular event based on a 10 year risk score by either the ASCVD Pooled Cohort Risk Assessment or the [place] Risk Score?*      Yes, by the ASCVD Pooled Cohort Risk Assessment.  Yes, by the [place] Risk Score.  No        Please advise      If this requires a response please respond to the pool ( P MHCX PSC MEDICATION PRE-AUTH).       Thank you please advise patient.

## 2025-01-27 ENCOUNTER — TELEPHONE (OUTPATIENT)
Dept: PRIMARY CARE CLINIC | Age: 65
End: 2025-01-27

## 2025-01-27 NOTE — TELEPHONE ENCOUNTER
----- Message from Sanjuana WALTERS sent at 1/27/2025 11:03 AM EST -----  Regarding: ECC Message to Provider  ECC Message to Provider    Relationship to Patient: Covered Entity/ [facility]    Additional Information / [facility] call and request for a prior authorizations for Repatha medication   it is already fax in the office   --------------------------------------------------------------------------------------------------------------------------    Call Back Information: Do not leave any message, patient will call back for answer  Preferred Call Back Number: Phone  [phone]   / [phone]

## 2025-01-28 NOTE — TELEPHONE ENCOUNTER
Please advise on question for pa request, this request expires tomorrow.          Questin from plan for pa request      .Has the patient been assessed for high risk of atherosclerotic cardiovascular disease (ASCVD) or cardiovascular event based on a 10 year risk score by either the ASCVD Pooled Cohort Risk Assessment or the [place] Risk Score?*      Yes, by the ASCVD Pooled Cohort Risk Assessment.  Yes, by the [place] Risk Score.  No        Please advise      If this requires a response please respond to the pool ( P MHCX PSC MEDICATION PRE-AUTH).       Thank you please advise patient.

## 2025-01-29 NOTE — TELEPHONE ENCOUNTER
The answer to the question is no. The patient has risk factors for cardiovascular disease. The patient has Type 2 diabetes, hypertension, hyperlipidemia, and obesity.

## 2025-01-29 NOTE — TELEPHONE ENCOUNTER
Pa request in CMM  tried to renew but wouldn't let me. Called plan @ [phone] spoke with Nathalia morales started over the phone, all clinical questions answered.       Turn around time is 3-5 business days       Follow up done daily; if no decision with in three days we will refax.  If another three days goes by with no decision will call the insurance for status.

## 2025-01-30 NOTE — TELEPHONE ENCOUNTER
The medication was DENIED; DENIAL letter is uploaded to MEDIA.    Generic Denial:  Other; please see Denial Letter.           Note :  LETTER IN MEDIA       If you want an APPEAL; please note in this encounter what new information you would like to APPEAL with.  Once complete route back to PA POOL.    If this requires a response please respond to the pool ( P [facility] MEDICATION PRE-AUTH).      Thank you please advise patient.

## 2025-01-31 ENCOUNTER — TELEPHONE (OUTPATIENT)
Dept: PRIMARY CARE CLINIC | Age: 65
End: 2025-01-31

## 2025-01-31 PROBLEM — E66.813 CLASS 3 SEVERE OBESITY WITH SERIOUS COMORBIDITY AND BODY MASS INDEX (BMI) OF 40.0 TO 44.9 IN ADULT: Status: ACTIVE | Noted: 2025-01-31

## 2025-01-31 PROBLEM — E66.01 CLASS 3 SEVERE OBESITY WITH SERIOUS COMORBIDITY AND BODY MASS INDEX (BMI) OF 40.0 TO 44.9 IN ADULT: Status: ACTIVE | Noted: 2025-01-31

## 2025-01-31 PROBLEM — M25.511 BILATERAL SHOULDER PAIN: Status: ACTIVE | Noted: 2025-01-31

## 2025-01-31 PROBLEM — M25.512 BILATERAL SHOULDER PAIN: Status: ACTIVE | Noted: 2025-01-31

## 2025-01-31 ASSESSMENT — ENCOUNTER SYMPTOMS
NAUSEA: 0
CHEST TIGHTNESS: 0
TROUBLE SWALLOWING: 0
COUGH: 0
BLOOD IN STOOL: 0
DIARRHEA: 0
VOMITING: 0
CONSTIPATION: 0
SHORTNESS OF BREATH: 0
ABDOMINAL PAIN: 0
WHEEZING: 0
SORE THROAT: 0
RHINORRHEA: 0

## 2025-01-31 NOTE — TELEPHONE ENCOUNTER
[facility] called let  Know that the PA was denied for the meds below. Pt did not make the clinical criteria. Please contact [facility] at [phone] for more info. Reference # is [id number].    Evolocumab 140 MG/ML SOAJ [[id number]]

## 2025-02-01 NOTE — ASSESSMENT & PLAN NOTE
-BMI 44.97  -Increase Mounjaro to 12.5 mg SC weekly for diabetes and weight loss  -Limit carbohydrates to 45 grams with meals and 15 grams with snacks  -Low sodium diet  -Low fat, low cholesterol diet  -Regular aerobic exercise  -Flank 64 ounces or more of water per day

## 2025-02-01 NOTE — ASSESSMENT & PLAN NOTE
-Not controlled  -LDL is not at goal  -Patient is not compliant with Zetia and does not tolerate statins  -Continue Zetia 10 mg once daily  -start Repatha 140mg SC every 14 days  -Low fat, low cholesterol diet  -Regular aerobic exercise

## 2025-02-01 NOTE — ASSESSMENT & PLAN NOTE
-Hemoglobin A1c of 6.3 shows diabetes is well-controlled  -Increase Mounjaro to 12.5 mg SC weekly  -Limit carbohydrates to 45 grams with meals and 15 grams with snacks  -monitor blood sugars  -goal for blood sugar fasting or pre-meal  is   -goal for blood sugar 2 hours after a meal is less than 180  -goal for blood sugar at bedtime is less than 150  -Regular aerobic exercise

## 2025-02-13 ENCOUNTER — TELEPHONE (OUTPATIENT)
Dept: ADMINISTRATIVE | Age: 65
End: 2025-02-13

## 2025-02-13 NOTE — TELEPHONE ENCOUNTER
Submitted PA for Ozempic (1 MG/DOSE) 4MG/3ML pen-injectors  Via CMM Key: [id number] STATUS: approved 1/14/25-2/13/26    Follow up done daily; if no decision with in three days we will refax.  If another three days goes by with no decision will call the insurance for status.

## 2025-02-27 ENCOUNTER — TELEPHONE (OUTPATIENT)
Dept: PRIMARY CARE CLINIC | Age: 65
End: 2025-02-27

## 2025-02-27 NOTE — TELEPHONE ENCOUNTER
Duplicate encounter, denial sent to Dr. Tom on 01/30/25. Awaiting review. Resent to Dr. Tom high priority

## 2025-02-27 NOTE — TELEPHONE ENCOUNTER
Melody Escudero prior authorization was denied by patient's insurance and they will not cover Repatha.

## 2025-02-28 NOTE — TELEPHONE ENCOUNTER
Patient has been informed insurance denied prior authorization for Repatha. Patient doesn't tolerate statins. Patient takes ezetimibe and should take it consistently. I also recommend a low fat, low cholesterol diet. Patient states she likes cheese.

## 2025-04-19 DIAGNOSIS — I10 HYPERTENSION, UNSPECIFIED TYPE: ICD-10-CM

## 2025-04-19 DIAGNOSIS — M25.473 ANKLE EDEMA: ICD-10-CM

## 2025-04-21 RX ORDER — HYDROCHLOROTHIAZIDE 25 MG/1
25 TABLET ORAL EVERY MORNING
Qty: 90 TABLET | Refills: 0 | Status: SHIPPED | OUTPATIENT
Start: 2025-04-21

## 2025-04-21 NOTE — TELEPHONE ENCOUNTER
Medication:   Requested Prescriptions     Pending Prescriptions Disp Refills    hydroCHLOROthiazide (HYDRODIURIL) 25 MG tablet [Pharmacy Med Name: HYDROCHLOROTHIAZIDE 25MGTABLETS] 30 tablet 3     Sig: TAKE 1 TABLET BY MOUTH EVERY MORNING     Last Filled:  05/20/2024    Last appt: 1/17/2025   Next appt: Visit date not found    Last OARRS: LVM for patient to call back for F/U appointment        No data to display

## 2025-08-19 DIAGNOSIS — I10 PRIMARY HYPERTENSION: Primary | ICD-10-CM

## 2025-08-20 ENCOUNTER — TELEPHONE (OUTPATIENT)
Dept: PRIMARY CARE CLINIC | Age: 65
End: 2025-08-20

## 2025-08-20 RX ORDER — AMLODIPINE BESYLATE 10 MG/1
10 TABLET ORAL DAILY
Qty: 30 TABLET | Refills: 0 | Status: SHIPPED | OUTPATIENT
Start: 2025-08-20

## 2025-08-20 RX ORDER — AMLODIPINE BESYLATE 10 MG/1
10 TABLET ORAL DAILY
Qty: 90 TABLET | Refills: 0 | OUTPATIENT
Start: 2025-08-20

## 2025-08-26 ENCOUNTER — OFFICE VISIT (OUTPATIENT)
Dept: PRIMARY CARE CLINIC | Age: 65
End: 2025-08-26
Payer: COMMERCIAL

## 2025-08-26 ENCOUNTER — HOSPITAL ENCOUNTER (OUTPATIENT)
Dept: GENERAL RADIOLOGY | Age: 65
Discharge: HOME | End: 2025-08-26
Payer: COMMERCIAL

## 2025-08-26 VITALS
RESPIRATION RATE: 16 BRPM | HEIGHT: 61 IN | DIASTOLIC BLOOD PRESSURE: 80 MMHG | OXYGEN SATURATION: 99 % | BODY MASS INDEX: 45.12 KG/M2 | TEMPERATURE: 96.8 F | WEIGHT: 239 LBS | SYSTOLIC BLOOD PRESSURE: 122 MMHG | HEART RATE: 97 BPM

## 2025-08-26 DIAGNOSIS — E11.9 TYPE 2 DIABETES MELLITUS WITHOUT COMPLICATION, WITHOUT LONG-TERM CURRENT USE OF INSULIN (HCC): Primary | ICD-10-CM

## 2025-08-26 DIAGNOSIS — E11.9 TYPE 2 DIABETES MELLITUS WITHOUT COMPLICATION, WITHOUT LONG-TERM CURRENT USE OF INSULIN (HCC): ICD-10-CM

## 2025-08-26 DIAGNOSIS — E78.2 MIXED HYPERLIPIDEMIA: ICD-10-CM

## 2025-08-26 DIAGNOSIS — R06.02 SHORTNESS OF BREATH: ICD-10-CM

## 2025-08-26 DIAGNOSIS — I10 PRIMARY HYPERTENSION: ICD-10-CM

## 2025-08-26 DIAGNOSIS — E66.813 CLASS 3 SEVERE OBESITY WITH SERIOUS COMORBIDITY AND BODY MASS INDEX (BMI) OF 45.0 TO 49.9 IN ADULT, UNSPECIFIED OBESITY TYPE (HCC): ICD-10-CM

## 2025-08-26 DIAGNOSIS — E55.9 VITAMIN D DEFICIENCY: ICD-10-CM

## 2025-08-26 DIAGNOSIS — R06.2 WHEEZING: ICD-10-CM

## 2025-08-26 DIAGNOSIS — M76.60 ACHILLES TENDON PAIN: ICD-10-CM

## 2025-08-26 LAB
25(OH)D3 SERPL-MCNC: 27.9 NG/ML
ALBUMIN SERPL-MCNC: 4.2 G/DL (ref 3.4–5)
ALBUMIN/GLOB SERPL: 1.6 {RATIO} (ref 1.1–2.2)
ALP SERPL-CCNC: 103 U/L (ref 40–129)
ALT SERPL-CCNC: 28 U/L (ref 10–40)
ANION GAP SERPL CALCULATED.3IONS-SCNC: 12 MMOL/L (ref 3–16)
AST SERPL-CCNC: 22 U/L (ref 15–37)
BILIRUB SERPL-MCNC: <0.2 MG/DL (ref 0–1)
BUN SERPL-MCNC: 13 MG/DL (ref 7–20)
CALCIUM SERPL-MCNC: 9.2 MG/DL (ref 8.3–10.6)
CHLORIDE SERPL-SCNC: 106 MMOL/L (ref 99–110)
CHOLEST SERPL-MCNC: 273 MG/DL (ref 0–199)
CO2 SERPL-SCNC: 23 MMOL/L (ref 21–32)
CREAT SERPL-MCNC: 0.9 MG/DL (ref 0.6–1.2)
CREAT UR-MCNC: 303 MG/DL (ref 28–259)
GFR SERPLBLD CREATININE-BSD FMLA CKD-EPI: 70 ML/MIN/{1.73_M2}
GLUCOSE SERPL-MCNC: 74 MG/DL (ref 70–99)
HBA1C MFR BLD: 6.5 %
HDLC SERPL-MCNC: 65 MG/DL (ref 40–60)
LDLC SERPL CALC-MCNC: 186 MG/DL
MICROALBUMIN UR DL<=1MG/L-MCNC: 1.23 MG/DL
MICROALBUMIN/CREAT UR: 4.1 MG/G (ref 0–30)
POTASSIUM SERPL-SCNC: 4.1 MMOL/L (ref 3.5–5.1)
PROT SERPL-MCNC: 6.9 G/DL (ref 6.4–8.2)
SODIUM SERPL-SCNC: 141 MMOL/L (ref 136–145)
TRIGL SERPL-MCNC: 108 MG/DL (ref 0–150)
VLDLC SERPL CALC-MCNC: 22 MG/DL

## 2025-08-26 PROCEDURE — 3044F HG A1C LEVEL LT 7.0%: CPT | Performed by: INTERNAL MEDICINE

## 2025-08-26 PROCEDURE — 3074F SYST BP LT 130 MM HG: CPT | Performed by: INTERNAL MEDICINE

## 2025-08-26 PROCEDURE — 71046 X-RAY EXAM CHEST 2 VIEWS: CPT

## 2025-08-26 PROCEDURE — 83036 HEMOGLOBIN GLYCOSYLATED A1C: CPT | Performed by: INTERNAL MEDICINE

## 2025-08-26 PROCEDURE — 99214 OFFICE O/P EST MOD 30 MIN: CPT | Performed by: INTERNAL MEDICINE

## 2025-08-26 PROCEDURE — 3079F DIAST BP 80-89 MM HG: CPT | Performed by: INTERNAL MEDICINE

## 2025-08-26 PROCEDURE — 1123F ACP DISCUSS/DSCN MKR DOCD: CPT | Performed by: INTERNAL MEDICINE

## 2025-08-26 RX ORDER — ALBUTEROL SULFATE 90 UG/1
2 INHALANT RESPIRATORY (INHALATION) EVERY 6 HOURS PRN
Qty: 18 G | Refills: 1 | Status: SHIPPED | OUTPATIENT
Start: 2025-08-26

## 2025-08-26 SDOH — ECONOMIC STABILITY: FOOD INSECURITY: WITHIN THE PAST 12 MONTHS, THE FOOD YOU BOUGHT JUST DIDN'T LAST AND YOU DIDN'T HAVE MONEY TO GET MORE.: NEVER TRUE

## 2025-08-26 SDOH — ECONOMIC STABILITY: FOOD INSECURITY: WITHIN THE PAST 12 MONTHS, YOU WORRIED THAT YOUR FOOD WOULD RUN OUT BEFORE YOU GOT MONEY TO BUY MORE.: NEVER TRUE

## 2025-08-26 ASSESSMENT — PATIENT HEALTH QUESTIONNAIRE - PHQ9
2. FEELING DOWN, DEPRESSED OR HOPELESS: NOT AT ALL
SUM OF ALL RESPONSES TO PHQ QUESTIONS 1-9: 0
1. LITTLE INTEREST OR PLEASURE IN DOING THINGS: NOT AT ALL
SUM OF ALL RESPONSES TO PHQ QUESTIONS 1-9: 0

## 2025-09-01 PROBLEM — R06.2 WHEEZING: Status: ACTIVE | Noted: 2025-09-01

## 2025-09-01 PROBLEM — E66.9 OBESITY, UNSPECIFIED: Status: ACTIVE | Noted: 2025-09-01

## 2025-09-01 ASSESSMENT — ENCOUNTER SYMPTOMS
BLOOD IN STOOL: 0
CONSTIPATION: 0
CHEST TIGHTNESS: 0
RHINORRHEA: 0
WHEEZING: 1
SHORTNESS OF BREATH: 1
COUGH: 0
TROUBLE SWALLOWING: 0
DIARRHEA: 0
VOMITING: 0
SORE THROAT: 0
ABDOMINAL PAIN: 0
NAUSEA: 0

## 2025-09-02 ENCOUNTER — OFFICE VISIT (OUTPATIENT)
Dept: ORTHOPEDIC SURGERY | Age: 65
End: 2025-09-02

## 2025-09-02 ENCOUNTER — TELEPHONE (OUTPATIENT)
Dept: ADMINISTRATIVE | Age: 65
End: 2025-09-02

## 2025-09-02 VITALS — WEIGHT: 239 LBS | BODY MASS INDEX: 45.12 KG/M2 | HEIGHT: 61 IN

## 2025-09-02 DIAGNOSIS — M79.672 LEFT FOOT PAIN: Primary | ICD-10-CM

## 2025-09-02 DIAGNOSIS — M25.572 LEFT ANKLE PAIN, UNSPECIFIED CHRONICITY: ICD-10-CM

## 2025-09-02 DIAGNOSIS — M92.62 HAGLUND'S DEFORMITY, LEFT: ICD-10-CM

## 2025-09-02 DIAGNOSIS — M76.62 LEFT ACHILLES TENDINITIS: ICD-10-CM

## 2025-09-02 RX ORDER — NAPROXEN 500 MG/1
500 TABLET ORAL 2 TIMES DAILY WITH MEALS
Qty: 60 TABLET | Refills: 0 | Status: SHIPPED | OUTPATIENT
Start: 2025-09-02 | End: 2025-10-02